# Patient Record
Sex: MALE | Race: WHITE | Employment: UNEMPLOYED | URBAN - METROPOLITAN AREA
[De-identification: names, ages, dates, MRNs, and addresses within clinical notes are randomized per-mention and may not be internally consistent; named-entity substitution may affect disease eponyms.]

---

## 2018-10-02 ENCOUNTER — OFFICE VISIT (OUTPATIENT)
Dept: DERMATOLOGY | Facility: AMBULATORY SURGERY CENTER | Age: 63
End: 2018-10-02

## 2018-10-02 VITALS
OXYGEN SATURATION: 98 % | BODY MASS INDEX: 30.44 KG/M2 | TEMPERATURE: 98.2 F | DIASTOLIC BLOOD PRESSURE: 88 MMHG | RESPIRATION RATE: 14 BRPM | HEART RATE: 62 BPM | HEIGHT: 76 IN | SYSTOLIC BLOOD PRESSURE: 140 MMHG | WEIGHT: 250 LBS

## 2018-10-02 DIAGNOSIS — Z80.8 FAMILY HISTORY OF SKIN CANCER: ICD-10-CM

## 2018-10-02 DIAGNOSIS — Z12.83 SCREENING FOR MALIGNANT NEOPLASM OF SKIN: ICD-10-CM

## 2018-10-02 DIAGNOSIS — Z87.898 HISTORY OF ATYPICAL NEVUS: Primary | ICD-10-CM

## 2018-10-02 DIAGNOSIS — L70.0 OPEN COMEDONE: ICD-10-CM

## 2018-10-02 DIAGNOSIS — L73.8 SEBACEOUS HYPERPLASIA OF FACE: ICD-10-CM

## 2018-10-02 DIAGNOSIS — D23.9 DERMATOFIBROMA: ICD-10-CM

## 2018-10-02 DIAGNOSIS — D22.9 MULTIPLE BENIGN NEVI: ICD-10-CM

## 2018-10-02 DIAGNOSIS — L73.9 FOLLICULITIS: ICD-10-CM

## 2018-10-02 DIAGNOSIS — D18.01 CHERRY ANGIOMA: ICD-10-CM

## 2018-10-02 DIAGNOSIS — L82.1 SEBORRHEIC KERATOSES: ICD-10-CM

## 2018-10-02 NOTE — PROGRESS NOTES
Written by Max Charles, as dictated by Tyler Noriega Ala, [address]. Name: Wendy Bermeo       Age: 61 y.o. Date: 10/2/2018    Chief Complaint:   Chief Complaint   Patient presents with    Skin Exam     full skin exam       Subjective:    HPI  Mr. Wendy Bermeo is a 61 y.o. male who presents for a full skin exam.  The patient's last skin exam was on 12/03/15 and the patient does not have current complaints related to his skin. He reports he was worried about a bleeding lesion on his left forearm that has since resolved one month ago. He states the lesion was present for 10 days. He has no other concerns. He reports little exposure to the sun. 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 No personal history of skin cancer. He reports a family history of skin cancer on his mother (type unknown)    ROS: Constitutional: Negative. Dermatological : negative    Social History     Social History    Marital status: SINGLE     Spouse name: N/A    Number of children: N/A    Years of education: N/A     Occupational History    Not on file.      Social History Main Topics    Smoking status: Former Smoker     Packs/day: 0.50     Years: 20.00     Quit date: 11/26/2002    Smokeless tobacco: Never Used      Comment: 2003    Alcohol use 7.5 oz/week     15 Standard drinks or equivalent per week    Drug use: No    Sexual activity: Not on file     Other Topics Concern    Not on file     Social History Narrative       Family History   Problem Relation Age of Onset    Hypertension Father     Diabetes Sister        Past Medical History:   Diagnosis Date    Allergic rhinitis, cause unspecified     CAD (coronary artery disease) 11/02    DMI    Depression     Family history of skin cancer     mother    Hypercholesterolemia     Hypertension     EDISON (obstructive sleep apnea)     using CPAP    Sunburn, blistering        Past Surgical History:   Procedure Laterality Date    HX HEART CATHETERIZATION  11/02    HX LUMBAR LAMINECTOMY  89    HX ORTHOPAEDIC      lumbar hnp    HX ORTHOPAEDIC      carpel tunnel       Current Outpatient Prescriptions   Medication Sig Dispense Refill    asenapine (SAPHRIS) 5 mg Subl SubLINGual tablet by SubLINGual route.  escitalopram (LEXAPRO) 20 mg tablet Take 20 mg by mouth daily.  rosuvastatin (CRESTOR) 40 mg tablet Take 40 mg by mouth nightly. NON FORMULARY       lisinopril-hydrochlorothiazide (PRINZIDE, ZESTORETIC) 20-12.5 mg per tablet TAKE 2 TABLETS EVERY DAY 60 Tab 4    atorvastatin (LIPITOR) 80 mg tablet Take 1 Tab by mouth daily. 30 Tab 6    cpap machine kit by Does Not Apply route.  metoprolol-XL (TOPROL XL) 100 mg XL tablet Take 1 Tab by mouth daily. 30 Tab 12    naproxen sodium (ALEVE) 220 mg tablet Take 220 mg by mouth daily (with breakfast). Indications: MILD ARTHRITIC PAIN, back; 2 qam      aspirin 81 mg Tab Take  by mouth.  amphetamine-dextroamphetamine XR (ADDERALL XR) 20 mg XR capsule Take 2 Caps by mouth every morning. 60 Cap 0    LORazepam (ATIVAN) 1 mg tablet Take  by mouth every four (4) hours as needed for Anxiety.  aripiprazole (ABILIFY) 2 mg tablet Take 2 mg by mouth as needed.  fluticasone (FLONASE) 50 mcg/Actuation nasal spray USE 2 SPRAYS IN EACH NOSTRIL DAILY 1 Bottle 11    citalopram (CELEXA) 20 mg tablet Take 10 mg by mouth daily.  AMBIEN CR 6.25 mg tablet Take 6.25 mg by mouth nightly as needed.  famotidine (PEPCID) 20 mg tablet Take 20 mg by mouth two (2) times daily as needed.  montelukast (SINGULAIR) 10 mg tablet Take 10 mg by mouth daily.          Allergies   Allergen Reactions    Levaquin [Levofloxacin] Unknown (comments)     Pt states he is no allergic  10/2/18 LRG-LPN         Objective:    Visit Vitals    /88 (BP 1 Location: Left arm, BP Patient Position: Sitting)    Pulse 62    Temp 98.2 °F (36.8 °C) (Oral)    Resp 14    Ht 6' 3.5\" (1.918 m)    Wt 250 lb (113.4 kg)    SpO2 98%    BMI 30.84 kg/m2       Germaine Villalobos is a 61 y.o. male who appears well and in no distress. He is awake, alert, and oriented. There is no preauricular, submandibular, or cervical lymphadenopathy. A skin examination was performed including his scalp, face (including eyelids), ears, neck, chest, back, abdomen, upper extremities (including digits/nails), lower extremities, breasts, buttocks; genital skin was not examined. He has scattered waxy macules and keratotic papules consistent with seborrheic keratoses. He has scattered red papules consistent with cherry angiomas. There are pink/yellow papules on the face consistent with sebaceous hyperplasia. He has numerous nevi on his trunk, largest on his abdomen, most numerous on his back. These are medium to dark brown color, centrally darker and lighter brown at the perimeter, none with significantly atypical features. He has pink follicular based papules and pustules on his back, shoulders, and trunk consistent with folliculitis. He has an open comedone on his back. He has a dermatofibroma on his left anterior leg. Assessment/Plan:    1. History of atypical nevus. I discussed sun protection, sunscreen use, the warning signs of skin cancer, the need for self-skin examinations, and the need for regular practitioner exams every 1 year. The patient should follow up sooner as needed if new, changing, or symptomatic skin lesions arise. 2. Family history of skin cancer. I discussed sun protection, sunscreen use, the warning signs of skin cancer, the need for self-skin examinations, and the need for regular practitioner exams every 1 year. The patient should follow up sooner as needed if new, changing, or symptomatic skin lesions arise. 3. Seborrheic keratoses. The diagnosis was reviewed and the patient was reassured that no treatment is needed for these benign lesions.     [facility] angiomas. The diagnosis was reviewed and the patient was reassured that no treatment is needed for these benign lesions. 5. Sebaceous Hyperplasia. The diagnosis was discussed as well as the benign nature of this condition. The patient was reassured that no treatment is needed at this time. 6. Normal nevi. The diagnosis of normal nevi was reviewed. I discussed sun protection, sunscreen use, the warning signs of skin cancer, the need for self-skin examinations, and the need for regular practitioner exams every 1 year. The patient should follow up sooner as needed if new, changing, or symptomatic skin lesions arise. 7. Folliculitis. The diagnosis was reviewed. I recommended the use of Selsun Blue on these areas. 8. Open comedone. The diagnosis was discussed and the patient desires removal. After verbal permission the material successfully extracted. 9. Dermatofibroma. The diagnosis was reviewed and the patient was reassured that no treatment is needed for these benign conditions at this time. The patient should follow up should the lesion change or become symptomatic. This plan was reviewed with the patient and patient agrees. All questions were answered. This scribe documentation was reviewed by me and accurately reflects the examination and decisions made by me.

## 2018-10-02 NOTE — MR AVS SNAPSHOT
[address] [facility] [address] 
[phone] Patient: Akbar Sewell MRN: [id number] NJW:5/24/2083 Visit Information Date & Time Provider Department Dept. Phone Encounter #  
 10/2/2018  1:00 PM LANDON Buckley [id number] Your Appointments 10/15/2018  9:00 AM  
New Patient with MD Shorty [address] ([facility]) Appt Note: New pt referred by [facility] for [address] current doctor is retiring will fax over Medical Records CSE 9/21/18 told to arrive @ 8:30 am  
 [address] [facility] [address] 1400 [address] [place] [address]  
[phone] [address] [facility] [address] [facility] Upcoming Health Maintenance Date Due Hepatitis C Screening 1955 Shingrix Vaccine Age 50> (1 of 2) 3/31/2005 FOBT Q 1 YEAR AGE 50-75 3/31/2005 DTaP/Tdap/Td series (2 - Tdap) 5/26/2018 Influenza Age 5 to Adult 8/1/2018 Allergies as of 10/2/2018  Review Complete On: 10/2/2018 By: Cathy Wells Severity Noted Reaction Type Reactions Levaquin [Levofloxacin]  04/26/2010    Unknown (comments) Pt states he is no allergic 
10/2/18 LRG-LPN Current Immunizations  Reviewed on 8/24/2011 Name Date DTAP Vaccine 5/26/2008 Influenza Vaccine Split 12/17/2010 ZZZ-RETIRED (DO NOT USE) Pneumococcal Vaccine (Unspecified Type) 4/26/2010 Not reviewed this visit Vitals BP Pulse Temp Resp Height(growth percentile) Weight(growth percentile) 140/88 (BP 1 Location: Left arm, BP Patient Position: Sitting) 62 98.2 °F (36.8 °C) (Oral) 14 6' 3.5\" (1.918 m) 250 lb (113.4 kg) SpO2 BMI Smoking Status 98% 30.84 kg/m2 Former Smoker Vitals History BMI and BSA Data Body Mass Index Body Surface Area  
 30.84 kg/m 2 2.46 m 2 Preferred Pharmacy Pharmacy Name Phone [id number] Zechariah Bhakta, [address] [facility] [phone] Your Updated Medication List  
  
   
This list is accurate as of 10/2/18  1:07 PM.  Always use your most recent med list.  
  
  
  
  
 ABILIFY 2 mg tablet Generic drug:  ARIPiprazole Take 2 mg by mouth as needed. ALEVE 220 mg tablet Generic drug:  naproxen sodium Take 220 mg by mouth daily (with breakfast). Indications: MILD ARTHRITIC PAIN, back; 2 qam  
  
 AMBIEN CR 6.25 mg tablet Generic drug:  zolpidem CR Take 6.25 mg by mouth nightly as needed. amphetamine-dextroamphetamine XR 20 mg XR capsule Commonly known as:  ADDERALL XR Take 2 Caps by mouth every morning. aspirin 81 mg tablet Take  by mouth. atorvastatin 80 mg tablet Commonly known as:  LIPITOR Take 1 Tab by mouth daily. CeleXA 20 mg tablet Generic drug:  citalopram  
Take 10 mg by mouth daily. cpap machine kit  
by Does Not Apply route. CRESTOR 40 mg tablet Generic drug:  rosuvastatin Take 40 mg by mouth nightly. NON FORMULARY  
  
 fluticasone 50 mcg/actuation nasal spray Commonly known as:  FLONASE  
USE 2 SPRAYS IN EACH NOSTRIL DAILY LEXAPRO 20 mg tablet Generic drug:  escitalopram oxalate Take 20 mg by mouth daily. lisinopril-hydroCHLOROthiazide 20-12.5 mg per tablet Commonly known as:  PRINZIDE, ZESTORETIC  
TAKE 2 TABLETS EVERY DAY  
  
 LORazepam 1 mg tablet Commonly known as:  ATIVAN Take  by mouth every four (4) hours as needed for Anxiety. metoprolol succinate 100 mg tablet Commonly known as:  TOPROL XL Take 1 Tab by mouth daily. PEPCID 20 mg tablet Generic drug:  famotidine Take 20 mg by mouth two (2) times daily as needed. SAPHRIS 5 mg Subl subLINGual tablet Generic drug:  asenapine  
by SubLINGual route. SINGULAIR 10 mg tablet Generic drug:  montelukast  
Take 10 mg by mouth daily. Patient Instructions Self Skin Exam and Sunscreens Early detection and treatment is essential in the treatment of all forms of skin cancer. If caught early, all forms of skin cancer are curable. In addition to your regular visits, you should perform a monthly skin examination. Over time, you become familiar with what is normally found on your skin and can identify new or suspicious spots. One of the screening tools you can use to assess your skin is to follow the ABCDEs: 
 
A= Asymmetry (One half is unlike the other half) B= Border (An irregular, scalloped or poorly defined edge) C= Color (Is varied from one area to another, has shades of tan, brown/ black,       white, red or blue) D= Diameter (Spots larger than 6mm or a pencil eraser) E= Evolving (New spots or one that is changing in size, shape, or color) A follow- up interval will be customized based on your history of skin cancer or level of skin damage and risk factors. In any case, if you notice a suspicious or new spot, an appointment should be arranged between regular visits. Everyone should use sunscreen and sun-safe practices, which is especially important for those with a personal or family history of skin cancer. Suggestions for this include: 1. Use daily moisturizers containing SPF 30 or higher. 2. Wear long sleeve clothing with UPF ratings and a broad-brimmed hat. 3. Apply sunscreen with SPF 30 or higher to all sun exposed areas if you are going to be in the sun. A broad spectrum UVA/ UVB sunscreen is best.  Dont forget to REAPPLY every two hours or more often if swimming or sweating! 4. Avoid outside activities during peak sun hours, especially in the summer (10am- 2pm). 5. DO NOT use tanning beds. Using sunscreen and sun-safe practices can help reduce the likelihood of developing skin cancer or additional skin cancers in those previously diagnosed. Introducing [facility] & HEALTH SERVICES! Dear Kate Cameron: Thank you for requesting a Net[employer] account. Our records indicate that you already have an active Netgen account. You can access your account anytime at [email or web address] Did you know that you can access your hospital and ER discharge instructions at any time in Netgen? You can also review all of your test results from your hospital stay or ER visit. Additional Information If you have questions, please visit the Frequently Asked Questions section of the Netgen website at [email or web address] Remember, Netgen is NOT to be used for urgent needs. For medical emergencies, dial 911. Now available from your iPhone and Android! Please provide this summary of care documentation to your next provider. Your primary care clinician is listed as Clair Louie. If you have any questions after today's visit, please call [phone].

## 2018-11-02 ENCOUNTER — OFFICE VISIT (OUTPATIENT)
Dept: BEHAVIORAL/MENTAL HEALTH CLINIC | Age: 63
End: 2018-11-02

## 2018-11-02 VITALS
HEIGHT: 76 IN | SYSTOLIC BLOOD PRESSURE: 147 MMHG | HEART RATE: 74 BPM | BODY MASS INDEX: 30.81 KG/M2 | WEIGHT: 253 LBS | DIASTOLIC BLOOD PRESSURE: 89 MMHG

## 2018-11-02 DIAGNOSIS — F10.14 ALCOHOL ABUSE WITH ALCOHOL-INDUCED MOOD DISORDER (HCC): Primary | ICD-10-CM

## 2018-11-02 DIAGNOSIS — G47.00 INSOMNIA DISORDER, WITH OTHER SLEEP DISORDER, RECURRENT: ICD-10-CM

## 2018-11-02 DIAGNOSIS — F10.20 ALCOHOL USE DISORDER, SEVERE, DEPENDENCE (HCC): ICD-10-CM

## 2018-11-02 DIAGNOSIS — G47.8 INSOMNIA DISORDER, WITH OTHER SLEEP DISORDER, RECURRENT: ICD-10-CM

## 2018-11-02 RX ORDER — DICLOFENAC SODIUM 75 MG/1
TABLET, DELAYED RELEASE ORAL
Refills: 2 | COMMUNITY
Start: 2018-10-22 | End: 2022-08-22 | Stop reason: ALTCHOICE

## 2018-11-02 RX ORDER — CLONAZEPAM 1 MG/1
TABLET ORAL
Refills: 2 | COMMUNITY
Start: 2018-09-26 | End: 2018-12-20 | Stop reason: SDUPTHER

## 2018-11-02 RX ORDER — LAMOTRIGINE 200 MG/1
TABLET ORAL
Refills: 5 | COMMUNITY
Start: 2018-10-22 | End: 2019-01-30 | Stop reason: SDUPTHER

## 2018-11-02 RX ORDER — AMLODIPINE BESYLATE 10 MG/1
5 TABLET ORAL
Refills: 11 | COMMUNITY
Start: 2018-10-22

## 2018-11-02 RX ORDER — ASENAPINE MALEATE 10 MG/1
10 TABLET SUBLINGUAL
Qty: 30 TAB | Refills: 5 | Status: SHIPPED | OUTPATIENT
Start: 2018-11-02 | End: 2019-02-04

## 2018-11-02 RX ORDER — ASENAPINE MALEATE 10 MG/1
TABLET SUBLINGUAL
Refills: 5 | COMMUNITY
Start: 2018-10-24 | End: 2018-11-02 | Stop reason: SDUPTHER

## 2018-11-02 RX ORDER — DEXTROAMPHETAMINE SACCHARATE, AMPHETAMINE ASPARTATE, DEXTROAMPHETAMINE SULFATE AND AMPHETAMINE SULFATE 5; 5; 5; 5 MG/1; MG/1; MG/1; MG/1
20 TABLET ORAL 2 TIMES DAILY
COMMUNITY
Start: 2017-08-17 | End: 2018-12-20 | Stop reason: SDUPTHER

## 2018-11-02 NOTE — PATIENT INSTRUCTIONS
Mr. Paulino Lefort, you need to attend an Alcohol Rehab program, Please or else we will not make any gains but add more pills that will prove dangerous and hazardous.

## 2018-11-02 NOTE — PROGRESS NOTES
INITIAL PSYCHIATRIC EVALUATION    IDENTIFICATION:      A. Name: Deni Baxter Age:     61 y.o.      C.   MRN: [id number]       D.   CSN:      [id number]      E. Admission Date: (Not on file)       PARK   :     1955               CHIEF COMPLAINT:  \" I have mood swings\". HPI: Mr. Keyur Barrios is a 60 y/o  (2nd for 29 years)( father of 2) [address], employed since March at [employer] He was  a patient of Dr. Alex Alexander whose care was transferred to this clinic. He was receiving treatment for Bipolar affective disorder and ADHD, alcohol use disorder and was on Lorazepam 1.5mg/day, Klonopin 1.5mg at hs,Lamictal 200mg at hs, Saphris SL 10mg daily,Lexapro 20mg and Adderall 20mg bid   He has been depressed since the ,following loss of business and filing for bankruptcy. He lost his job over 1.5 years ago and was unemployed for 1 year. He was fired because of major errors he made at work. He endorsed all the symptoms of depression on the Geriatricscale, which are currently active despite the 6 psychotropics he is prescribed. He continues to drink alcohol on a regular basis, more than a bottle of wine. He is not suicidal and never has been   He has marital friction ( wife is s/p lingual CA for which she had radiation treatment and has difficulty with articulation)        PRECIPITATING FACTORS:loss of business,filing for bankruptcy in the  but more recent,he was fired from his job 1&1/2 y/ago ( [employer]) because he made errors and was reprimanded.     COPING METHODS:  Internalize,drink alcohol    PHQ-9 scores/Geriatric depression scale:12/15 ( unhappy,dissatisfied,not active, empty,bored,fearful, helpless,isolating,memory problems,lack of energy,others are better off)  HAM-A scores:17/56 ( anxious,tense,memory deficits,dep, problems)  Mood Disorder Questionaire scores:8/13( irritable,racing thoughts,distractible,increased sex,spending,risky behavior)             REVIEW OF PSYCHIATRIC SYSTEMS:  Patient did not meet criteria for a  PTSD, Schizophrenia, Bipolar Affective disorder  Obsessive Compulsive Disorder, Anxiety Disorder, Agoraphobia, Psychotic disorders or ASD. PAST PSYCHIATRIC HISTORY:    Outpatient Psychiatric treatments:He has worked with  Marquis Frost, his therapist is Paz Perez  Suicide attempts/self inflictive behaviors:never  Hospitalizations:  [facility] for one day over 20 years ago ( locked self in br)  Medications Tried:  Celexa,Abilify,Ambien cR 6.25, Seroquel  ECT:   None   Legal/Assault History:  Nil    PAST SUBSTANCE ABUSE HISTORY:  Continues to drink alcohol, he scored 3/4 on the CAGE, denies other illicit drug use    FAMILY PSYCH HISTORY:Maternal grandfather may have been bipolar, maternal uncle with depression ,parents and siblings have suffered from anxiety and take Lorazepam.       SOCIAL HISTORY:  He is the oldest of 4 children born to an intact marriage, upbringing was uneventful, was bullied, [place] in the [place], then [place]. Father just  2 years . Has a BA from [employer], liberal arts degree/communication. Has been  twice, first lasted 4 years, currently still in second marriage. Has 2 children. Lives with wife. Longest job held was with  [employer] for 11 years    PAST MEDICAL/SURGICAL HISTORY:  EDISON,hypertension, hypercholesterolemia,history of CAD,s/p lumbar laminectomy, repair of CTS  Current Outpatient Medications   Medication Sig Dispense Refill    dextroamphetamine-amphetamine (ADDERALL) 20 mg tablet Take 20 mg by mouth two (2) times a day.  amLODIPine (NORVASC) 10 mg tablet TAKE 1 TABLET BY MOUTH EVERY DAY  11    clonazePAM (KLONOPIN) 1 mg tablet TAKE 1 & 1/2 TABLETS BY MOUTH AT BEDTIME AS NEEDED  2    lamoTRIgine (LAMICTAL) 200 mg tablet TAKE 1 TABLET BY MOUTH EVERY DAY  5    diclofenac EC (VOLTAREN) 75 mg EC tablet TAKE 1 TABLET (75 MG TOTAL) BY MOUTH 2 (TWO) TIMES A DAY.  TAKE WITH FOOD  2    SAPHRIS 10 mg subl 1 Tab by SubLINGual route nightly. 30 Tab 5    LORazepam (ATIVAN) 1 mg tablet Take  by mouth every four (4) hours as needed for Anxiety.  escitalopram (LEXAPRO) 20 mg tablet Take 20 mg by mouth daily.  rosuvastatin (CRESTOR) 40 mg tablet Take 40 mg by mouth nightly. NON FORMULARY       lisinopril-hydrochlorothiazide (PRINZIDE, ZESTORETIC) 20-12.5 mg per tablet TAKE 2 TABLETS EVERY DAY 60 Tab 4    atorvastatin (LIPITOR) 80 mg tablet Take 1 Tab by mouth daily. 30 Tab 6    fluticasone (FLONASE) 50 mcg/Actuation nasal spray USE 2 SPRAYS IN EACH NOSTRIL DAILY 1 Bottle 11    cpap machine kit by Does Not Apply route.  metoprolol-XL (TOPROL XL) 100 mg XL tablet Take 1 Tab by mouth daily. 30 Tab 12    naproxen sodium (ALEVE) 220 mg tablet Take 220 mg by mouth daily (with breakfast). Indications: MILD ARTHRITIC PAIN, back; 2 qam      aspirin 81 mg Tab Take  by mouth.  montelukast (SINGULAIR) 10 mg tablet Take 10 mg by mouth daily. Medical review of systems mainly considered within normal limits expect as noted in history above. Pertinent LABS: none seen since 2010      ALLERGIES:   He is allergic to levaquin [levofloxacin].       MENTAL STATUS EXAM:  Orientation person, place, time/date and situation    Behavior/Eye contact:  Good eye contact   Appearance:  age appropriate   Motor Behavior:  within normal limits but appears sluggish   Speech:  monotone, normal pitch and soft   Thought Process: goal directed and logical   Thought Content free of delusions and free of hallucinations   Suicidal ideations no plan  and no intention   Homicidal ideations no plan  and no intention   Mood:  depressed, irritable and sad   Affect:  blunted and constricted, almost parkinsonian   Memory recent  adequate   Memory remote:  adequate   Concentration:  adequate   Abstraction:  abstract   Insight:  good   Reliability good   Perceptual disortions  Absent( auditory,visual,olfactory,tactile), patient denied         ASSESSMENT:  The patient is a 61 y.o.  male with a history of what sounds like an Adjustment disorder with depression/anxiety that was compounded by alcohol use continuous and it's complications. Suicide/Homicide risk : (Nil,Low, Moderate, High)    PROVISIONAL DIAGNOSES:    ICD-10-CM ICD-9-CM   1. Alcohol abuse with alcohol-induced mood disorder ([facility] F10.14 291.89   2. Alcohol use disorder, severe, dependence ([facility] F10.20 303.90   3. Insomnia disorder, with other sleep disorder, recurrent G47.00 780.52       Orders Placed This Encounter    SAPHRIS 10 mg subl     Si Tab by SubLINGual route nightly. Dispense:  30 Tab     Refill:  5       TREATMENT PLAN:       1. Medications:  (Medication Changes/Adjustments)          WE called his pharmacy and he already has refills on Lamictal (2)Lexapro(2),Lorazepam (2)Klonopin (1), none on the Saphris . He has scripts for his Adderall still at home He last filled it in September ( \" I don't take but 1 a day, although it is prescribed for bid)     Saphris 10mg SL to take at HS #30 X 5 refills ordered       The following regarding medications was addressed: (The risks and benefits of the proposed medication; the potential medication side effects ie. dry mouth, weight gain, GI upset, headache; The  patient was given opportunity to ask questions)               2. Counseling/ psychotherapy:    Psych-education provided: The adverse effects of Alcohol on health and his mood. Avoidance of benzodiazepine, Adderall, and Saphris in his case, their adverse effects    3. Labs/ tests/ old records/ collateral: had work up 6 months ago, but no results here    4. Follow up : 4 weeks if not admitted to the Rehab program    5: If you feel suicidal after hours, please call the [address] [place] [address] @ [phone] OR GO TO THE NEAREST [address].  YOU MAY ALSO ACCESS THE SUICIDE HOTLINE @ [facility] OF SUICIDE. COM/RESOURCES\"    Referrals/Consults: ADVISED TO ATTEND AN ALCOHOL REHAB PROGRAM, LIST OF PLACES/PROGRAMS WAS PROVIDED. HE WAS ADVISED THAT W/O ABSTINENCE HE WILL NOT MAKE ANY PROGRESS IN HIS MENTAL HEALTH      Mr. Geronimo Coello has a reminder for a \"due or due soon\" health maintenance. I have asked that he contact his primary care provider for follow-up on this health maintenance. SIGNED:    Larry Sierra MD,   Adult Psychiatrist/Psychosomatic Medicine  11/2/2018

## 2018-11-29 ENCOUNTER — OFFICE VISIT (OUTPATIENT)
Dept: BEHAVIORAL/MENTAL HEALTH CLINIC | Age: 63
End: 2018-11-29

## 2018-11-29 VITALS
HEIGHT: 76 IN | SYSTOLIC BLOOD PRESSURE: 146 MMHG | HEART RATE: 90 BPM | DIASTOLIC BLOOD PRESSURE: 88 MMHG | WEIGHT: 253 LBS | BODY MASS INDEX: 30.81 KG/M2

## 2018-11-29 DIAGNOSIS — F10.14 ALCOHOL ABUSE WITH ALCOHOL-INDUCED MOOD DISORDER (HCC): Primary | ICD-10-CM

## 2018-11-29 DIAGNOSIS — F10.280 ALCOHOL DEPENDENCE WITH ALCOHOL-INDUCED ANXIETY DISORDER (HCC): ICD-10-CM

## 2018-11-29 DIAGNOSIS — G47.30 INSOMNIA WITH SLEEP APNEA: ICD-10-CM

## 2018-11-29 DIAGNOSIS — G47.00 INSOMNIA WITH SLEEP APNEA: ICD-10-CM

## 2018-11-29 RX ORDER — TOPIRAMATE 50 MG/1
TABLET, FILM COATED ORAL
Qty: 90 TAB | Refills: 1 | Status: SHIPPED | OUTPATIENT
Start: 2018-11-29 | End: 2019-01-17 | Stop reason: SDUPTHER

## 2018-11-29 NOTE — PROGRESS NOTES
Psychiatric Outpatient Progress Note    Account Number:  [id number]  Name: Dorene Gaming    SUBJECTIVE:     CHIEF COMPLAINT:    HPI:  Dorene Gaming is a 61 y.o. male and was seen today for follow-up of psychiatric condition and psychotropic medication management. Mr. Bre Bauer is a 60 y/o  (2nd for 29 years)( father of 2) [address], employed since March at [employer] He was  a patient of Dr. Bao Dennis whose care was transferred to this clinic. He was receiving treatment for Bipolar affective disorder and ADHD, alcohol use disorder and was on Lorazepam 1.5mg/day, Klonopin 1.5mg at hs,Lamictal 200mg at hs, Saphris SL 10mg daily,Lexapro 20mg and Adderall 20mg bid   He has been depressed since the 90s,following loss of business and filing for bankruptcy. He lost his job over 1.5 years ago and was unemployed for 1 year. He was fired because of major errors he made at work. He endorsed all the symptoms of depression on the Geriatricscale, which are currently active despite the 6 psychotropics he is prescribed. He continues to drink alcohol on a regular basis, more than a bottle of wine.    He is not suicidal and never has been   He has marital friction ( wife is s/p lingual CA for which she had radiation treatment and has difficulty with articulation)           PRECIPITATING FACTORS:loss of business,filing for bankruptcy in the 90s but more recent,he was fired from his job 1&1/2 y/ago ( [employer]) because he made errors and was reprimanded.     COPING METHODS:  Internalize,drink alcohol     PHQ-9 scores/Geriatric depression scale:12/15 ( unhappy,dissatisfied,not active, empty,bored,fearful, helpless,isolating,memory problems,lack of energy,others are better off)  HAM-A scores:17/56 ( anxious,tense,memory deficits,dep, problems)  Mood Disorder Questionaire scores:8/13( irritable,racing thoughts,distractible,increased sex,spending,risky behavior)        Course of events since last visit: Did not follow thru with alcohol rehab. Claims to be drinking only on the weekends. Fears losing his job. Discussed using Topiramate. Will also have therapist provide marital counseling, home environment is tense due to both kids being at home and keeping secrets. ..  . Patient denies SI/HI/SIB. 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 REVIEW OF SYSTEMS:  Pertinent items are noted in HPI. Visit Vitals  /88   Pulse 90   Ht 6' 3.5\" (1.918 m)   Wt 114.8 kg (253 lb)   BMI 31.21 kg/m²       OBJECTIVE:                 Mental Status exam: WNL except for      Attitude/Behavior     Eye Contact    appropriate   Appearance:  age appropriate and well dressed   Motor Behavior/Gait:  within normal limits   Speech:  hypoverbal and soft   Thought Process: goal directed and logical   Thought Content free of delusions and free of hallucinations   Perceptual distortions  Patient denied any visual,auditory,olfactory or gustatory hallucinations.  No illusions were reported or noted eithter   Suicidal ideations no plan  and no intention   Homicidal ideations no plan  and no intention   Mood:  anxious and depressed   Affect:  depressed     Orientation/sensorium  Alert and oriented to  date,place, situation and persons   Memory recent  adequate   Memory remote:  adequate   Concentration:  adequate   Abstraction:  abstract   Insight:  good   Reliability good   Judgment:  good       MEDICAL DECISION MAKING:     Data: pertinent labs, imaging, medical records and diagnostic tests reviewed and incorporated in diagnosis and treatment plan    Allergies   Allergen Reactions    Levaquin [Levofloxacin] Unknown (comments)     Pt states he is no allergic  10/2/18 [facility]        Current Outpatient Medications   Medication Sig Dispense Refill    topiramate (TOPAMAX) 50 mg tablet Take one at bedtime for one week, then take 2 at bedtime for one week, then take 3 at bedtime thereafter 90 Tab 1    dextroamphetamine-amphetamine (ADDERALL) 20 mg tablet Take 20 mg by mouth two (2) times a day.  amLODIPine (NORVASC) 10 mg tablet TAKE 1 TABLET BY MOUTH EVERY DAY  11    clonazePAM (KLONOPIN) 1 mg tablet TAKE 1 & 1/2 TABLETS BY MOUTH AT BEDTIME AS NEEDED  2    lamoTRIgine (LAMICTAL) 200 mg tablet TAKE 1 TABLET BY MOUTH EVERY DAY  5    diclofenac EC (VOLTAREN) 75 mg EC tablet TAKE 1 TABLET (75 MG TOTAL) BY MOUTH 2 (TWO) TIMES A DAY. TAKE WITH FOOD  2    SAPHRIS 10 mg subl 1 Tab by SubLINGual route nightly. 30 Tab 5    LORazepam (ATIVAN) 1 mg tablet Take  by mouth every four (4) hours as needed for Anxiety.  escitalopram (LEXAPRO) 20 mg tablet Take 20 mg by mouth daily.  rosuvastatin (CRESTOR) 40 mg tablet Take 40 mg by mouth nightly. NON FORMULARY       lisinopril-hydrochlorothiazide (PRINZIDE, ZESTORETIC) 20-12.5 mg per tablet TAKE 2 TABLETS EVERY DAY 60 Tab 4    atorvastatin (LIPITOR) 80 mg tablet Take 1 Tab by mouth daily. 30 Tab 6    fluticasone (FLONASE) 50 mcg/Actuation nasal spray USE 2 SPRAYS IN EACH NOSTRIL DAILY 1 Bottle 11    cpap machine kit by Does Not Apply route.  metoprolol-XL (TOPROL XL) 100 mg XL tablet Take 1 Tab by mouth daily. 30 Tab 12    naproxen sodium (ALEVE) 220 mg tablet Take 220 mg by mouth daily (with breakfast). Indications: MILD ARTHRITIC PAIN, back; 2 qam      aspirin 81 mg Tab Take  by mouth.  montelukast (SINGULAIR) 10 mg tablet Take 10 mg by mouth daily.                     ICD-10-CM ICD-9-CM    1. Alcohol abuse with alcohol-induced mood disorder (HCC) F10.14 291.89 topiramate (TOPAMAX) 50 mg tablet   2. Alcohol dependence with alcohol-induced anxiety disorder (HCC) F10.280 303.90 topiramate (TOPAMAX) 50 mg tablet     291.89    3.  Insomnia with sleep apnea G47.00 780.51     G47.30         Orders Placed This Encounter    topiramate (TOPAMAX) 50 mg tablet     Sig: Take one at bedtime for one week, then take 2 at bedtime for one week, then take 3 at bedtime thereafter     Dispense:  90 Tab     Refill:  1           Assessment:   Stella Johnson  is a 61 y.o.  male  is not responding to treatment. Symptoms are persisting since he still drinks, longest duration w/o alcohol was  7 days. Patient denies SI/HI/SIB. No evidence of AH/VH or delusions. Risk Scoring- chronic illnesses and prescription drug management    Treatment PlanTreatment plan reviewed with patient-including diagnosis and medications:            Medication Changes/Adjustments: Will place on Topiramate 50mg at hs and titrate to 150mg in two weeks    Current Outpatient Medications   Medication Sig Dispense Refill    topiramate (TOPAMAX) 50 mg tablet Take one at bedtime for one week, then take 2 at bedtime for one week, then take 3 at bedtime thereafter 90 Tab 1    dextroamphetamine-amphetamine (ADDERALL) 20 mg tablet Take 20 mg by mouth two (2) times a day.  amLODIPine (NORVASC) 10 mg tablet TAKE 1 TABLET BY MOUTH EVERY DAY  11    clonazePAM (KLONOPIN) 1 mg tablet TAKE 1 & 1/2 TABLETS BY MOUTH AT BEDTIME AS NEEDED  2    lamoTRIgine (LAMICTAL) 200 mg tablet TAKE 1 TABLET BY MOUTH EVERY DAY  5    diclofenac EC (VOLTAREN) 75 mg EC tablet TAKE 1 TABLET (75 MG TOTAL) BY MOUTH 2 (TWO) TIMES A DAY. TAKE WITH FOOD  2    SAPHRIS 10 mg subl 1 Tab by SubLINGual route nightly. 30 Tab 5    LORazepam (ATIVAN) 1 mg tablet Take  by mouth every four (4) hours as needed for Anxiety.  escitalopram (LEXAPRO) 20 mg tablet Take 20 mg by mouth daily.  rosuvastatin (CRESTOR) 40 mg tablet Take 40 mg by mouth nightly. NON FORMULARY       lisinopril-hydrochlorothiazide (PRINZIDE, ZESTORETIC) 20-12.5 mg per tablet TAKE 2 TABLETS EVERY DAY 60 Tab 4    atorvastatin (LIPITOR) 80 mg tablet Take 1 Tab by mouth daily. 30 Tab 6    fluticasone (FLONASE) 50 mcg/Actuation nasal spray USE 2 SPRAYS IN EACH NOSTRIL DAILY 1 Bottle 11    cpap machine kit by Does Not Apply route.       metoprolol-XL (TOPROL XL) 100 mg XL tablet Take 1 Tab by mouth daily. 30 Tab 12    naproxen sodium (ALEVE) 220 mg tablet Take 220 mg by mouth daily (with breakfast). Indications: MILD ARTHRITIC PAIN, back; 2 qam      aspirin 81 mg Tab Take  by mouth.  montelukast (SINGULAIR) 10 mg tablet Take 10 mg by mouth daily. The following regarding medications was addressed:    (The risks, benefits of the proposed medication and the potential medication side effects ie dry mouth, weight gain, GI upset, headache). The patient was given the opportunity to ask questions. The patient was informed of the consequences of refusing medications and non-compliance. 2.  Counseling and coordination of care including instructions for treatment, risks/benefits, risk factor reduction and patient/family education. He agrees with the plan. 3.Patient instructed to call with any side effects, questions or issues. PSYCHOTHERAPY:  approx 18 minutes  Supportive but challenged his rationalization. ..which enables to continue to drink  Homework given regarding:   Psychoeducation with handouts provided:  Effects of alcohol on health and emotional well being             Mr. Sary Savage has a reminder for a \"due or due soon\" health maintenance. I have asked that he contact his primary care provider for follow-up on this health maintenance. Azul Karla is not progressing. Follow-up Disposition:  Return in about 3 weeks (around 12/20/2018).       Frankey Fairly, MD  11/29/2018

## 2018-12-20 ENCOUNTER — OFFICE VISIT (OUTPATIENT)
Dept: BEHAVIORAL/MENTAL HEALTH CLINIC | Age: 63
End: 2018-12-20

## 2018-12-20 VITALS
DIASTOLIC BLOOD PRESSURE: 71 MMHG | WEIGHT: 244 LBS | SYSTOLIC BLOOD PRESSURE: 115 MMHG | BODY MASS INDEX: 29.71 KG/M2 | HEIGHT: 76 IN | HEART RATE: 78 BPM

## 2018-12-20 DIAGNOSIS — G47.30 INSOMNIA WITH SLEEP APNEA: ICD-10-CM

## 2018-12-20 DIAGNOSIS — F10.14 ALCOHOL ABUSE WITH ALCOHOL-INDUCED MOOD DISORDER (HCC): Primary | ICD-10-CM

## 2018-12-20 DIAGNOSIS — G47.00 INSOMNIA WITH SLEEP APNEA: ICD-10-CM

## 2018-12-20 DIAGNOSIS — Z91.89 COMPLIANCE WITH MEDICATION REGIMEN: ICD-10-CM

## 2018-12-20 DIAGNOSIS — F33.9 EPISODE OF RECURRENT MAJOR DEPRESSIVE DISORDER, UNSPECIFIED DEPRESSION EPISODE SEVERITY (HCC): ICD-10-CM

## 2018-12-20 DIAGNOSIS — F41.9 ANXIETY: ICD-10-CM

## 2018-12-20 RX ORDER — ESCITALOPRAM OXALATE 20 MG/1
20 TABLET ORAL DAILY
Qty: 30 TAB | Refills: 5 | Status: SHIPPED | OUTPATIENT
Start: 2018-12-20 | End: 2019-07-30 | Stop reason: SDUPTHER

## 2018-12-20 RX ORDER — CLONAZEPAM 1 MG/1
1.5 TABLET ORAL
Qty: 45 TAB | Refills: 3 | Status: SHIPPED | OUTPATIENT
Start: 2018-12-20 | End: 2019-04-16 | Stop reason: SDUPTHER

## 2018-12-20 RX ORDER — DEXTROAMPHETAMINE SACCHARATE, AMPHETAMINE ASPARTATE, DEXTROAMPHETAMINE SULFATE AND AMPHETAMINE SULFATE 5; 5; 5; 5 MG/1; MG/1; MG/1; MG/1
20 TABLET ORAL 2 TIMES DAILY
Qty: 60 TAB | Refills: 0 | Status: SHIPPED | OUTPATIENT
Start: 2018-12-20 | End: 2019-04-16 | Stop reason: SDUPTHER

## 2018-12-20 NOTE — PROGRESS NOTES
Psychiatric Outpatient Progress Note    Account Number:  [id number]  Name: Alec Loera    SUBJECTIVE:     CHIEF COMPLAINT:    HPI:  Alec Loera is a 61 y.o. male and was seen today for follow-up of psychiatric condition and psychotropic medication management. Mr. Ilsa aTmayo is a 62 y/o  (2nd for 29 years)( father of 2) [address], employed since March at [employer] He was  a patient of Dr. Jordan Bueno whose care was transferred to this clinic. He was receiving treatment for Bipolar affective disorder and ADHD, alcohol use disorder and was on Lorazepam 1.5mg/day, Klonopin 1.5mg at hs,Lamictal 200mg at hs, Saphris SL 10mg daily,Lexapro 20mg and Adderall 20mg bid   He has been depressed since the 90s,following loss of business and filing for bankruptcy. He lost his job over 1.5 years ago and was unemployed for 1 year. He was fired because of major errors he made at work. He endorsed all the symptoms of depression on the Geriatricscale, which are currently active despite the 6 psychotropics he is prescribed. He continues to drink alcohol on a regular basis, more than a bottle of wine.    He is not suicidal and never has been   He has marital friction ( wife is s/p lingual CA for which she had radiation treatment and has difficulty with articulation)           PRECIPITATING FACTORS:loss of business,filing for bankruptcy in the 90s but more recent,he was fired from his job 1&1/2 y/ago ( [employer]) because he made errors and was reprimanded.     COPING METHODS:  Internalize,drink alcohol     PHQ-9 scores/Geriatric depression scale:12/15 ( unhappy,dissatisfied,not active, empty,bored,fearful, helpless,isolating,memory problems,lack of energy,others are better off)  HAM-A scores:17/56 ( anxious,tense,memory deficits,dep, problems)  Mood Disorder Questionaire scores:8/13( irritable,racing thoughts,distractible,increased sex,spending,risky behavior)        Course of events since last visit: Since he did not follow thru with alcohol rehab earlier, he was placed on Topiramate in the last visit. He returns today stating that he hasn't been drinking any alcohol since the last time and that he did not have any withdrawal symptoms. He has managed w/o alcohol. However he continues to tell me he needs the Saphris because when he went out of town, he could not sleep w/o it. He also insisted on the Benzodiazepines, 'help me with motivation and anxiety, because he worry about so many things that I am not \"there\", my family tells me that I space out\". He is clearly preoccupied with matters pertaining to income and his son's behavior. His home life is tense. He is seeing Tal Castro on an individual basis. He continues to be anxious and worried, like going to his mothers for [place]. Not sure what medicine is doing what??  .    Patient denies SI/HI/SIB. 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 REVIEW OF SYSTEMS:  Pertinent items are noted in HPI. Visit Vitals  /71   Pulse 78   Ht 6' 3.5\" (1.918 m)   Wt 110.7 kg (244 lb)   BMI 30.10 kg/m²       OBJECTIVE:                 Mental Status exam: WNL except for      Attitude/Behavior Guarded but cooperative, shy    Eye Contact    appropriate   Appearance:  age appropriate and well dressed   Motor Behavior/Gait:  within normal limits   Speech:  hypoverbal and soft   Thought Process: goal directed and logical   Thought Content free of delusions and free of hallucinations   Perceptual distortions  Patient denied any visual,auditory,olfactory or gustatory hallucinations.  No illusions were reported or noted eithter   Suicidal ideations no plan  and no intention   Homicidal ideations no plan  and no intention   Mood:  anxious and depressed   Affect:  depressed     Orientation/sensorium  Alert and oriented to  date,place, situation and persons   Memory recent  adequate   Memory remote:  adequate   Concentration:  adequate Abstraction:  abstract   Insight:  good   Reliability good   Judgment:  good       MEDICAL DECISION MAKING:     Data: pertinent labs, imaging, medical records and diagnostic tests reviewed and incorporated in diagnosis and treatment plan    Allergies   Allergen Reactions    Levaquin [Levofloxacin] Unknown (comments)     Pt states he is no allergic  10/2/18 [facility]        Current Outpatient Medications   Medication Sig Dispense Refill    escitalopram oxalate (LEXAPRO) 20 mg tablet Take 1 Tab by mouth daily. 30 Tab 5    dextroamphetamine-amphetamine (ADDERALL) 20 mg tablet Take 1 Tab (20 mg total) by mouth two (2) times a day. Max Daily Amount: 40 mg 60 Tab 0    clonazePAM (KLONOPIN) 1 mg tablet Take 1.5 Tabs by mouth nightly. Max Daily Amount: 1.5 mg. 45 Tab 3    topiramate (TOPAMAX) 50 mg tablet Take one at bedtime for one week, then take 2 at bedtime for one week, then take 3 at bedtime thereafter (Patient taking differently: take 3 at bedtime) 90 Tab 1    amLODIPine (NORVASC) 10 mg tablet TAKE 1 TABLET BY MOUTH EVERY DAY  11    lamoTRIgine (LAMICTAL) 200 mg tablet TAKE 1 TABLET BY MOUTH EVERY DAY  5    diclofenac EC (VOLTAREN) 75 mg EC tablet TAKE 1 TABLET (75 MG TOTAL) BY MOUTH 2 (TWO) TIMES A DAY. TAKE WITH FOOD  2    SAPHRIS 10 mg subl 1 Tab by SubLINGual route nightly. 30 Tab 5    LORazepam (ATIVAN) 1 mg tablet Take  by mouth every four (4) hours as needed for Anxiety.  rosuvastatin (CRESTOR) 40 mg tablet Take 40 mg by mouth nightly. NON FORMULARY       lisinopril-hydrochlorothiazide (PRINZIDE, ZESTORETIC) 20-12.5 mg per tablet TAKE 2 TABLETS EVERY DAY 60 Tab 4    atorvastatin (LIPITOR) 80 mg tablet Take 1 Tab by mouth daily. 30 Tab 6    fluticasone (FLONASE) 50 mcg/Actuation nasal spray USE 2 SPRAYS IN EACH NOSTRIL DAILY 1 Bottle 11    cpap machine kit by Does Not Apply route.  metoprolol-XL (TOPROL XL) 100 mg XL tablet Take 1 Tab by mouth daily.  30 Tab 12    naproxen sodium (ALEVE) 220 mg tablet Take 220 mg by mouth daily (with breakfast). Indications: MILD ARTHRITIC PAIN, back; 2 qam      aspirin 81 mg Tab Take  by mouth.  montelukast (SINGULAIR) 10 mg tablet Take 10 mg by mouth daily.                     ICD-10-CM ICD-9-CM    1. Alcohol abuse with alcohol-induced mood disorder ([facility] [address] F10.14 291.89    2. Insomnia with sleep apnea G47.00 780.51     G47.30     3. Episode of recurrent major depressive disorder, unspecified depression episode severity (HCC) F33.9 296.30 escitalopram oxalate (LEXAPRO) 20 mg tablet      dextroamphetamine-amphetamine (ADDERALL) 20 mg tablet   4. Anxiety F41.9 300.00 clonazePAM (KLONOPIN) 1 mg tablet   5. Compliance with medication regimen Z91.89 V49.89 10-PANEL URINE DRUG SCREEN       Orders Placed This Encounter    10-PANEL URINE DRUG SCREEN    escitalopram oxalate (LEXAPRO) 20 mg tablet     Sig: Take 1 Tab by mouth daily. Dispense:  30 Tab     Refill:  5    dextroamphetamine-amphetamine (ADDERALL) 20 mg tablet     Sig: Take 1 Tab (20 mg total) by mouth two (2) times a day. Max Daily Amount: 40 mg     Dispense:  60 Tab     Refill:  0    clonazePAM (KLONOPIN) 1 mg tablet     Sig: Take 1.5 Tabs by mouth nightly. Max Daily Amount: 1.5 mg.     Dispense:  45 Tab     Refill:  3           Assessment:   Yamilex Rock  is a 61 y.o.  male  is  responding to treatment since he has BEEN ABSTINENT FROM ALCOHOL FOR THE PAST 3 WEEKS. HOWEVER HE REMAINS WORRIED ABOUT LEGITIMATE ISSUES AND APPEARS DEPRESSED. Patient denies SI/HI/SIB. No evidence of AH/VH or delusions. Risk Scoring- chronic illnesses and prescription drug management    Treatment PlanTreatment plan reviewed with patient-including diagnosis and medications:            Medication Changes/Adjustments: Will continue Topiramate 150mg at hs of which he has 1 refill left.  Will renew Saphris 10mg, Klonopin 1.5mg at hs, Lexapro and Adderall 20mg bid ( prn when needed at work)     Current Outpatient Medications   Medication Sig Dispense Refill    escitalopram oxalate (LEXAPRO) 20 mg tablet Take 1 Tab by mouth daily. 30 Tab 5    dextroamphetamine-amphetamine (ADDERALL) 20 mg tablet Take 1 Tab (20 mg total) by mouth two (2) times a day. Max Daily Amount: 40 mg 60 Tab 0    clonazePAM (KLONOPIN) 1 mg tablet Take 1.5 Tabs by mouth nightly. Max Daily Amount: 1.5 mg. 45 Tab 3    topiramate (TOPAMAX) 50 mg tablet Take one at bedtime for one week, then take 2 at bedtime for one week, then take 3 at bedtime thereafter (Patient taking differently: take 3 at bedtime) 90 Tab 1    amLODIPine (NORVASC) 10 mg tablet TAKE 1 TABLET BY MOUTH EVERY DAY  11    lamoTRIgine (LAMICTAL) 200 mg tablet TAKE 1 TABLET BY MOUTH EVERY DAY  5    diclofenac EC (VOLTAREN) 75 mg EC tablet TAKE 1 TABLET (75 MG TOTAL) BY MOUTH 2 (TWO) TIMES A DAY. TAKE WITH FOOD  2    SAPHRIS 10 mg subl 1 Tab by SubLINGual route nightly. 30 Tab 5    LORazepam (ATIVAN) 1 mg tablet Take  by mouth every four (4) hours as needed for Anxiety.  rosuvastatin (CRESTOR) 40 mg tablet Take 40 mg by mouth nightly. NON FORMULARY       lisinopril-hydrochlorothiazide (PRINZIDE, ZESTORETIC) 20-12.5 mg per tablet TAKE 2 TABLETS EVERY DAY 60 Tab 4    atorvastatin (LIPITOR) 80 mg tablet Take 1 Tab by mouth daily. 30 Tab 6    fluticasone (FLONASE) 50 mcg/Actuation nasal spray USE 2 SPRAYS IN EACH NOSTRIL DAILY 1 Bottle 11    cpap machine kit by Does Not Apply route.  metoprolol-XL (TOPROL XL) 100 mg XL tablet Take 1 Tab by mouth daily. 30 Tab 12    naproxen sodium (ALEVE) 220 mg tablet Take 220 mg by mouth daily (with breakfast). Indications: MILD ARTHRITIC PAIN, back; 2 qam      aspirin 81 mg Tab Take  by mouth.  montelukast (SINGULAIR) 10 mg tablet Take 10 mg by mouth daily.                     The following regarding medications was addressed:    (The risks, benefits of the proposed medication and the potential medication side effects ie dry mouth, weight gain, GI upset, headache). The patient was given the opportunity to ask questions. The patient was informed of the consequences of refusing medications and non-compliance. 2.  Counseling and coordination of care including instructions for treatment, risks/benefits, risk factor reduction and patient/family education. He agrees with the plan. 3.Patient instructed to call with any side effects, questions or issues. PSYCHOTHERAPY:  approx 20 minutes  Supportive but challenged his rationalization. ..which enables to continue to drink  Homework given regarding:   Psychoeducation with handouts provided:  Educated on the effects of klonopin and ativan with respect to alcohol withdrawal. Challenged regarding taking all these medications but remaining symptomatic, need to decipher which is doing what and to minimize. He agreed. Also signed the Controlled substance agreement. UDS ordered. Mr. Chico Sunshine has a reminder for a \"due or due soon\" health maintenance. I have asked that he contact his primary care provider for follow-up on this health maintenance. Uriel Ashuas is not progressing. Follow-up Disposition:  Return in about 4 weeks (around 1/17/2019).       Lizzie Verma MD  12/20/2018

## 2019-01-17 ENCOUNTER — OFFICE VISIT (OUTPATIENT)
Dept: BEHAVIORAL/MENTAL HEALTH CLINIC | Age: 64
End: 2019-01-17

## 2019-01-17 VITALS
HEART RATE: 62 BPM | SYSTOLIC BLOOD PRESSURE: 122 MMHG | WEIGHT: 247 LBS | HEIGHT: 76 IN | BODY MASS INDEX: 30.08 KG/M2 | DIASTOLIC BLOOD PRESSURE: 76 MMHG

## 2019-01-17 DIAGNOSIS — F10.14 ALCOHOL ABUSE WITH ALCOHOL-INDUCED MOOD DISORDER (HCC): ICD-10-CM

## 2019-01-17 DIAGNOSIS — G47.30 INSOMNIA WITH SLEEP APNEA: ICD-10-CM

## 2019-01-17 DIAGNOSIS — G47.00 INSOMNIA WITH SLEEP APNEA: ICD-10-CM

## 2019-01-17 DIAGNOSIS — F33.9 EPISODE OF RECURRENT MAJOR DEPRESSIVE DISORDER, UNSPECIFIED DEPRESSION EPISODE SEVERITY (HCC): ICD-10-CM

## 2019-01-17 DIAGNOSIS — F10.280 ALCOHOL DEPENDENCE WITH ALCOHOL-INDUCED ANXIETY DISORDER (HCC): Primary | ICD-10-CM

## 2019-01-17 RX ORDER — TOPIRAMATE 50 MG/1
150 TABLET, FILM COATED ORAL DAILY
Qty: 90 TAB | Refills: 3 | Status: SHIPPED | OUTPATIENT
Start: 2019-01-17 | End: 2019-04-16 | Stop reason: SDUPTHER

## 2019-01-17 RX ORDER — TAMSULOSIN HYDROCHLORIDE 0.4 MG/1
0.4 CAPSULE ORAL DAILY
COMMUNITY

## 2019-01-17 NOTE — PROGRESS NOTES
Psychiatric Outpatient Progress Note    Account Number:  [id number]  Name: Estrada Savage    SUBJECTIVE:     CHIEF COMPLAINT:    HPI:  Estrada Savage is a 61 y.o. male and was seen today for follow-up of psychiatric condition and psychotropic medication management. Mr. Nam Huerta is a 62 y/o  (2nd for 29 years)( father of 2) [address], employed since March at [employer] He was  a patient of Dr. Janusz Porter whose care was transferred to this clinic. He was receiving treatment for Bipolar affective disorder and ADHD, alcohol use disorder and was on Lorazepam 1.5mg/day, Klonopin 1.5mg at hs,Lamictal 200mg at hs, Saphris SL 10mg daily,Lexapro 20mg and Adderall 20mg bid   He has been depressed since the 90s,following loss of business and filing for bankruptcy. He lost his job over 1.5 years ago and was unemployed for 1 year. He was fired because of major errors he made at work. He endorsed all the symptoms of depression on the Geriatricscale, which are currently active despite the 6 psychotropics he is prescribed. He continues to drink alcohol on a regular basis, more than a bottle of wine.    He is not suicidal and never has been   He has marital friction ( wife is s/p lingual CA for which she had radiation treatment and has difficulty with articulation)           PRECIPITATING FACTORS:loss of business,filing for bankruptcy in the 90s but more recent,he was fired from his job 1&1/2 y/ago ( [employer]) because he made errors and was reprimanded.     COPING METHODS:  Internalize,drink alcohol     PHQ-9 scores/Geriatric depression scale:12/15 ( unhappy,dissatisfied,not active, empty,bored,fearful, helpless,isolating,memory problems,lack of energy,others are better off)  HAM-A scores:17/56 ( anxious,tense,memory deficits,dep, problems)  Mood Disorder Questionaire scores:8/13( irritable,racing thoughts,distractible,increased sex,spending,risky behavior)        Course of events since last visit: IN the last Visit, the  Saphris 10mg, Klonopin 1.5mg at hs, Lexapro and Adderall 20mg bid ( prn when needed at work) were renewed. He was still taking the Topiramate  He is clearly preoccupied with matters pertaining to income and his family. He is ignored by them despite their dependence on him. He is disturbed about their nonchalant behavior and lack of support from his wife. He is seeing Lubna Olivas on an individual basis. Established problems: alcohol  Use, depression, anxiety, insomnia  New Problems: urinary frequency,tooth decay  R/O EARLY DRUG INDUCED PARKINSONIAN SYMPTOMS    Patient denies SI/HI/SIB     Side Effects:  none      Fam/Social changes: Finally got a raise in his base pay which has provided more hope for him. REVIEW OF SYSTEMS:  Pertinent items are noted in HPI. Visit Vitals  /76   Pulse 62   Ht 6' 3.5\" (1.918 m)   Wt 112 kg (247 lb)   BMI 30.47 kg/m²       OBJECTIVE:                 Mental Status exam: WNL except for      Attitude/Behavior Guarded but cooperative, shy    Eye Contact    appropriate   Appearance:  age appropriate and well dressed   Motor Behavior/Gait:  BRADYKINESIA NOTED   Speech:  hypoverbal and soft   Thought Process: goal directed and logical   Thought Content free of delusions and free of hallucinations   Perceptual distortions  Patient denied any visual,auditory,olfactory or gustatory hallucinations. No illusions were reported or noted eithter   Suicidal ideations no plan  and no intention   Homicidal ideations no plan  and no intention   Mood:  anxious and depressed   Affect:  Depressed, ALMOST FLAT AFFECT     Orientation/sensorium  Alert and oriented to  date,place, situation and persons   Memory recent  adequate   Memory remote:  adequate   Concentration:  adequate   Abstraction:  abstract   Insight:  good   Reliability good   Judgment:  good       MEDICAL DECISION MAKING:  [facility].  MAINLY LABS PERTAINING TO UA, CBCD,CMP , ALL OF WHICH WERE NORMAL AS OF JAN. 0,4455    Data: pertinent labs, imaging, medical records and diagnostic tests reviewed and incorporated in diagnosis and treatment plan    Allergies   Allergen Reactions    Levaquin [Levofloxacin] Unknown (comments)     Pt states he is no allergic  10/2/18 [facility]-LPN        Current Outpatient Medications   Medication Sig Dispense Refill    tamsulosin (FLOMAX) 0.4 mg capsule Take 0.4 mg by mouth daily.  topiramate (TOPAMAX) 50 mg tablet Take 3 Tabs by mouth daily. 90 Tab 3    escitalopram oxalate (LEXAPRO) 20 mg tablet Take 1 Tab by mouth daily. 30 Tab 5    dextroamphetamine-amphetamine (ADDERALL) 20 mg tablet Take 1 Tab (20 mg total) by mouth two (2) times a day. Max Daily Amount: 40 mg (Patient taking differently: Take 20 mg by mouth daily.        ) 60 Tab 0    clonazePAM (KLONOPIN) 1 mg tablet Take 1.5 Tabs by mouth nightly. Max Daily Amount: 1.5 mg. 45 Tab 3    amLODIPine (NORVASC) 10 mg tablet TAKE 1 TABLET BY MOUTH EVERY DAY  11    lamoTRIgine (LAMICTAL) 200 mg tablet TAKE 1 TABLET BY MOUTH EVERY DAY  5    diclofenac EC (VOLTAREN) 75 mg EC tablet TAKE 1 TABLET (75 MG TOTAL) BY MOUTH 2 (TWO) TIMES A DAY. TAKE WITH FOOD  2    SAPHRIS 10 mg subl 1 Tab by SubLINGual route nightly. 30 Tab 5    LORazepam (ATIVAN) 1 mg tablet Take  by mouth every four (4) hours as needed for Anxiety.  rosuvastatin (CRESTOR) 40 mg tablet Take 40 mg by mouth nightly. NON FORMULARY       lisinopril-hydrochlorothiazide (PRINZIDE, ZESTORETIC) 20-12.5 mg per tablet TAKE 2 TABLETS EVERY DAY 60 Tab 4    atorvastatin (LIPITOR) 80 mg tablet Take 1 Tab by mouth daily. 30 Tab 6    fluticasone (FLONASE) 50 mcg/Actuation nasal spray USE 2 SPRAYS IN EACH NOSTRIL DAILY 1 Bottle 11    metoprolol-XL (TOPROL XL) 100 mg XL tablet Take 1 Tab by mouth daily. 30 Tab 12    naproxen sodium (ALEVE) 220 mg tablet Take 220 mg by mouth daily (with breakfast).  Indications: MILD ARTHRITIC PAIN, back; 2 qam      aspirin 81 mg Tab Take  by mouth.  cpap machine kit by Does Not Apply route.  montelukast (SINGULAIR) 10 mg tablet Take 10 mg by mouth daily.                     ICD-10-CM ICD-9-CM    1. Alcohol dependence with alcohol-induced anxiety disorder (HCC) F10.280 303.90 topiramate (TOPAMAX) 50 mg tablet     291.89    2. Insomnia with sleep apnea G47.00 780.51     G47.30     3. Episode of recurrent major depressive disorder, unspecified depression episode severity ([facility] [address] F33.9 296.30    4. Alcohol abuse with alcohol-induced mood disorder (HCC) F10.14 291.89 topiramate (TOPAMAX) 50 mg tablet       Orders Placed This Encounter    topiramate (TOPAMAX) 50 mg tablet     Sig: Take 3 Tabs by mouth daily. Dispense:  90 Tab     Refill:  3           Assessment:   Una Marinelli  is a 61 y.o.  male  is  responding to treatment since he has BEEN ABSTINENT FROM ALCOHOL except for new years jitendra and occasional use (only 2) on the weekends. His depressed affect is related to situation at home, sense of helplessness. Patient denies SI/HI/SIB. No evidence of AH/VH or delusions. Treatment PlanTreatment plan reviewed with patient-including diagnosis and medications:            Medication Changes/Adjustments: Will renew Topiramate 150mg at hs of which he has 1 refill left. He has ample refills of other medications. ADVISED TO TAKE LESS SAPHRIS(FROM 10 TO 5MG ) TO PREVENT PARKINSONIAN SYMPTOMS. Current Outpatient Medications   Medication Sig Dispense Refill    tamsulosin (FLOMAX) 0.4 mg capsule Take 0.4 mg by mouth daily.  topiramate (TOPAMAX) 50 mg tablet Take 3 Tabs by mouth daily. 90 Tab 3    escitalopram oxalate (LEXAPRO) 20 mg tablet Take 1 Tab by mouth daily. 30 Tab 5    dextroamphetamine-amphetamine (ADDERALL) 20 mg tablet Take 1 Tab (20 mg total) by mouth two (2) times a day.   Max Daily Amount: 40 mg (Patient taking differently: Take 20 mg by mouth daily.        ) 60 Tab 0    clonazePAM (KLONOPIN) 1 mg tablet Take 1.5 Tabs by mouth nightly. Max Daily Amount: 1.5 mg. 45 Tab 3    amLODIPine (NORVASC) 10 mg tablet TAKE 1 TABLET BY MOUTH EVERY DAY  11    lamoTRIgine (LAMICTAL) 200 mg tablet TAKE 1 TABLET BY MOUTH EVERY DAY  5    diclofenac EC (VOLTAREN) 75 mg EC tablet TAKE 1 TABLET (75 MG TOTAL) BY MOUTH 2 (TWO) TIMES A DAY. TAKE WITH FOOD  2    SAPHRIS 10 mg subl 1 Tab by SubLINGual route nightly. 30 Tab 5    LORazepam (ATIVAN) 1 mg tablet Take  by mouth every four (4) hours as needed for Anxiety.  rosuvastatin (CRESTOR) 40 mg tablet Take 40 mg by mouth nightly. NON FORMULARY       lisinopril-hydrochlorothiazide (PRINZIDE, ZESTORETIC) 20-12.5 mg per tablet TAKE 2 TABLETS EVERY DAY 60 Tab 4    atorvastatin (LIPITOR) 80 mg tablet Take 1 Tab by mouth daily. 30 Tab 6    fluticasone (FLONASE) 50 mcg/Actuation nasal spray USE 2 SPRAYS IN EACH NOSTRIL DAILY 1 Bottle 11    metoprolol-XL (TOPROL XL) 100 mg XL tablet Take 1 Tab by mouth daily. 30 Tab 12    naproxen sodium (ALEVE) 220 mg tablet Take 220 mg by mouth daily (with breakfast). Indications: MILD ARTHRITIC PAIN, back; 2 qam      aspirin 81 mg Tab Take  by mouth.  cpap machine kit by Does Not Apply route.  montelukast (SINGULAIR) 10 mg tablet Take 10 mg by mouth daily. The following regarding medications was addressed:    (The risks, benefits of the proposed medication and the potential medication side effects ie dry mouth, weight gain, GI upset, headache). The patient was given the opportunity to ask questions. The patient was informed of the consequences of refusing medications and non-compliance. 2.  Counseling and coordination of care including instructions for treatment, risks/benefits, risk factor reduction and patient/family education. He agrees with the plan. 3.Patient instructed to call with any side effects, questions or issues.      PSYCHOTHERAPY:  approx 25 minutes  Supportive BUT EXPLORED REASONS FOR THE WAY HIS FAMILY IS TREATING HIM, HOW DID HE GET THERE AND WHAT HE CAN DO TO ALTER THAT. HE AGREED TO LOOK INTO IT. Homework given regarding:NONE   Psychoeducation with handouts provided:  . Also signed the Controlled substance agreement. UDS ordered. Mr. Sunny Mason has a reminder for a \"due or due soon\" health maintenance. I have asked that he contact his primary care provider for follow-up on this health maintenance. Miya Mack is  progressing. Follow-up Disposition:  Return in about 3 months (around 4/17/2019).       Trisha Bolanos MD  1/17/2019

## 2019-01-19 LAB
AMPHETAMINES UR QL SCN: NEGATIVE NG/ML
BARBITURATES UR QL SCN: NEGATIVE NG/ML
BENZODIAZ UR QL: NEGATIVE NG/ML
BZE UR QL: NEGATIVE NG/ML
CANNABINOIDS UR QL SCN: NEGATIVE NG/ML
MDMA UR QL SCN: NEGATIVE NG/ML
METHADONE UR QL SCN: NEGATIVE NG/ML
METHAQUALONE UR QL: NEGATIVE NG/ML
OPIATES UR QL: NEGATIVE NG/ML
PCP UR QL: NEGATIVE NG/ML
PROPOXYPH UR QL SCN: NEGATIVE NG/ML

## 2019-02-01 ENCOUNTER — TELEPHONE (OUTPATIENT)
Dept: BEHAVIORAL/MENTAL HEALTH CLINIC | Age: 64
End: 2019-02-01

## 2019-02-04 DIAGNOSIS — F51.05 INSOMNIA DISORDER, WITH NON-SLEEP DISORDER MENTAL COMORBIDITY, RECURRENT: Primary | ICD-10-CM

## 2019-02-04 RX ORDER — TRAZODONE HYDROCHLORIDE 100 MG/1
100 TABLET ORAL
Qty: 30 TAB | Refills: 2 | Status: SHIPPED | OUTPATIENT
Start: 2019-02-04 | End: 2019-04-16 | Stop reason: DRUGHIGH

## 2019-02-04 RX ORDER — LAMOTRIGINE 200 MG/1
TABLET ORAL
Qty: 30 TAB | Refills: 5 | Status: SHIPPED | OUTPATIENT
Start: 2019-02-04 | End: 2019-07-29 | Stop reason: SDUPTHER

## 2019-03-29 DIAGNOSIS — R45.1 AGITATION: Primary | ICD-10-CM

## 2019-03-29 RX ORDER — LORAZEPAM 1 MG/1
TABLET ORAL
Qty: 45 TAB | Refills: 0 | Status: SHIPPED | OUTPATIENT
Start: 2019-03-29 | End: 2019-05-15 | Stop reason: SDUPTHER

## 2019-03-29 NOTE — TELEPHONE ENCOUNTER
Pt called wanting to know why his Lorazepam was denied.  was checked and it was last refilled on 11/12/18 pharmacist from [facility] also confirmed last filled date.

## 2019-04-16 ENCOUNTER — OFFICE VISIT (OUTPATIENT)
Dept: BEHAVIORAL/MENTAL HEALTH CLINIC | Age: 64
End: 2019-04-16

## 2019-04-16 VITALS
BODY MASS INDEX: 30.44 KG/M2 | HEIGHT: 76 IN | DIASTOLIC BLOOD PRESSURE: 67 MMHG | SYSTOLIC BLOOD PRESSURE: 121 MMHG | HEART RATE: 78 BPM | WEIGHT: 250 LBS

## 2019-04-16 DIAGNOSIS — F10.280 ALCOHOL DEPENDENCE WITH ALCOHOL-INDUCED ANXIETY DISORDER (HCC): Primary | ICD-10-CM

## 2019-04-16 DIAGNOSIS — F10.14 ALCOHOL ABUSE WITH ALCOHOL-INDUCED MOOD DISORDER (HCC): ICD-10-CM

## 2019-04-16 DIAGNOSIS — F51.05 INSOMNIA DISORDER, WITH NON-SLEEP DISORDER MENTAL COMORBIDITY, PERSISTENT: ICD-10-CM

## 2019-04-16 DIAGNOSIS — F33.9 EPISODE OF RECURRENT MAJOR DEPRESSIVE DISORDER, UNSPECIFIED DEPRESSION EPISODE SEVERITY (HCC): ICD-10-CM

## 2019-04-16 DIAGNOSIS — G47.30 INSOMNIA WITH SLEEP APNEA: ICD-10-CM

## 2019-04-16 DIAGNOSIS — F41.9 ANXIETY: ICD-10-CM

## 2019-04-16 DIAGNOSIS — G47.00 INSOMNIA WITH SLEEP APNEA: ICD-10-CM

## 2019-04-16 RX ORDER — CLONAZEPAM 1 MG/1
1.5 TABLET ORAL
Qty: 45 TAB | Refills: 3 | Status: SHIPPED | OUTPATIENT
Start: 2019-04-16 | End: 2019-08-07 | Stop reason: SDUPTHER

## 2019-04-16 RX ORDER — ASENAPINE 5 MG/1
5 TABLET SUBLINGUAL
Qty: 30 TAB | Refills: 3 | Status: SHIPPED | OUTPATIENT
Start: 2019-04-16 | End: 2019-08-07

## 2019-04-16 RX ORDER — DEXTROAMPHETAMINE SACCHARATE, AMPHETAMINE ASPARTATE, DEXTROAMPHETAMINE SULFATE AND AMPHETAMINE SULFATE 5; 5; 5; 5 MG/1; MG/1; MG/1; MG/1
20 TABLET ORAL 2 TIMES DAILY
Qty: 60 TAB | Refills: 0 | Status: SHIPPED | OUTPATIENT
Start: 2019-04-16 | End: 2019-08-07 | Stop reason: SDUPTHER

## 2019-04-16 RX ORDER — TOPIRAMATE 50 MG/1
150 TABLET, FILM COATED ORAL DAILY
Qty: 90 TAB | Refills: 3 | Status: SHIPPED | OUTPATIENT
Start: 2019-04-16 | End: 2019-08-07 | Stop reason: SDUPTHER

## 2019-04-16 RX ORDER — TRAZODONE HYDROCHLORIDE 150 MG/1
150 TABLET ORAL
Qty: 30 TAB | Refills: 3 | Status: SHIPPED | OUTPATIENT
Start: 2019-04-16 | End: 2019-08-07 | Stop reason: SDUPTHER

## 2019-04-16 RX ORDER — ASENAPINE MALEATE 10 MG/1
TABLET SUBLINGUAL
Refills: 5 | COMMUNITY
Start: 2019-03-30 | End: 2019-04-16 | Stop reason: DRUGHIGH

## 2019-04-16 NOTE — PROGRESS NOTES
Psychiatric Outpatient Progress Note    Account Number:  [id number]  Name: Kuldeep Romo    SUBJECTIVE:     CHIEF COMPLAINT:    HPI:  Kuldeep Romo is a 59 y.o. male and was seen today for follow-up of psychiatric condition and psychotropic medication management. Mr. Alvin Quinn is a 62 y/o  (2nd for 29 years)( father of 2) [address], employed since March at [employer] He was  a patient of Dr. Pinky Aldana whose care was transferred to this clinic. He was receiving treatment for Bipolar affective disorder and ADHD, alcohol use disorder and was on Lorazepam 1.5mg/day, Klonopin 1.5mg at hs,Lamictal 200mg at hs, Saphris SL 10mg daily,Lexapro 20mg and Adderall 20mg bid   He has been depressed since the 90s,following loss of business and filing for bankruptcy. He lost his job over 1.5 years ago and was unemployed for 1 year. He was fired because of major errors he made at work. He endorsed all the symptoms of depression on the Geriatricscale, which are currently active despite the 6 psychotropics he is prescribed. He continues to drink alcohol on a regular basis, more than a bottle of wine.    He is not suicidal and never has been   He has marital friction ( wife is s/p lingual CA for which she had radiation treatment and has difficulty with articulation)           PRECIPITATING FACTORS:loss of business,filing for bankruptcy in the 90s but more recent,he was fired from his job 1&1/2 y/ago ( [employer]) because he made errors and was reprimanded.     COPING METHODS:  Internalize,drink alcohol     PHQ-9 scores/Geriatric depression scale:12/15 ( unhappy,dissatisfied,not active, empty,bored,fearful, helpless,isolating,memory problems,lack of energy,others are better off)  HAM-A scores:17/56 ( anxious,tense,memory deficits,dep, problems)  Mood Disorder Questionaire scores:8/13( irritable,racing thoughts,distractible,increased sex,spending,risky behavior)        Course of events since last visit: IN the last Visit, the  Saphris 10mg, Klonopin 1.5mg at hs, Lexapro and Adderall 20mg bid ( prn when needed at work) were renewed but he had called and requested to discontinue the Saphris which was and Trazadone was added. Today, he reports taking the Saphris because he could not sleep and yet sleeps 8 hours/night, \" I need 9 hours\". He is still drinking alcohol but claims to be drinking only 14/week. He remains frustrated with his son but is doing well at work. Plans to visit his therapist more often due to new insurance rules/absence of co-pays for [facility] visits. He is seeing Nalini Gillespie on an individual basis. Established problems: alcohol  Use, depression, anxiety, insomnia, anhedonia  New Problems: problems with son/family    R/O EARLY DRUG INDUCED PARKINSONIAN SYMPTOMS    Patient denies SI/HI/SIB     Side Effects:  none      Fam/Social changes: Finally got a raise in his base pay which has provided more hope for him. REVIEW OF SYSTEMS:  Pertinent items are noted in HPI. Visit Vitals  /67   Pulse 78   Ht 6' 3.5\" (1.918 m)   Wt 113.4 kg (250 lb)   BMI 30.84 kg/m²       OBJECTIVE:                 Mental Status exam: WNL except for      Attitude/Behavior Guarded but cooperative, shy    Eye Contact    appropriate   Appearance:  age appropriate and well dressed   Motor Behavior/Gait:  Normal PM activity   Speech:  hypoverbal and soft   Thought Process: goal directed and logical   Thought Content free of delusions and free of hallucinations   Perceptual distortions  Patient denied any visual,auditory,olfactory or gustatory hallucinations.  No illusions were reported or noted eithter   Suicidal ideations no plan  and no intention   Homicidal ideations no plan  and no intention   Mood:  anxious and depressed   Affect:  Dysphoric but smiled more     Orientation/sensorium  Alert and oriented to  date,place, situation and persons   Memory recent  adequate   Memory remote:  adequate   Concentration:  adequate Abstraction:  abstract   Insight:  good   Reliability good   Judgment:  good       MEDICAL DECISION MAKING:  Reviewed records from [address]. MAINLY LABS PERTAINING TO UA, CBCD,CMP , ALL OF WHICH WERE NORMAL AS OF LIZETH. 3,2019    Data: pertinent labs, imaging, medical records and diagnostic tests reviewed and incorporated in diagnosis and treatment plan    Allergies   Allergen Reactions    Levaquin [Levofloxacin] Unknown (comments)     Pt states he is no allergic  10/2/18 [facility]-LPN        Current Outpatient Medications   Medication Sig Dispense Refill    traZODone (DESYREL) 150 mg tablet Take 1 Tab by mouth nightly. 30 Tab 3    topiramate (TOPAMAX) 50 mg tablet Take 3 Tabs by mouth daily. 90 Tab 3    asenapine (SAPHRIS) 5 mg subl subLINGual tablet 1 Tab by SubLINGual route nightly. 30 Tab 3    clonazePAM (KLONOPIN) 1 mg tablet Take 1.5 Tabs by mouth nightly. Max Daily Amount: 1.5 mg. 45 Tab 3    dextroamphetamine-amphetamine (ADDERALL) 20 mg tablet Take 1 Tab by mouth two (2) times a day. Max Daily Amount: 40 mg. 60 Tab 0    LORazepam (ATIVAN) 1 mg tablet Take 1 1/2 daily in the am. 45 Tab 0    lamoTRIgine (LAMICTAL) 200 mg tablet TAKE 1 TABLET BY MOUTH EVERY DAY 30 Tab 5    tamsulosin (FLOMAX) 0.4 mg capsule Take 0.4 mg by mouth daily.  escitalopram oxalate (LEXAPRO) 20 mg tablet Take 1 Tab by mouth daily. 30 Tab 5    amLODIPine (NORVASC) 10 mg tablet TAKE 1 TABLET BY MOUTH EVERY DAY  11    diclofenac EC (VOLTAREN) 75 mg EC tablet TAKE 1 TABLET (75 MG TOTAL) BY MOUTH 2 (TWO) TIMES A DAY. TAKE WITH FOOD  2    rosuvastatin (CRESTOR) 40 mg tablet Take 40 mg by mouth nightly. NON FORMULARY       lisinopril-hydrochlorothiazide (PRINZIDE, ZESTORETIC) 20-12.5 mg per tablet TAKE 2 TABLETS EVERY DAY 60 Tab 4    fluticasone (FLONASE) 50 mcg/Actuation nasal spray USE 2 SPRAYS IN EACH NOSTRIL DAILY 1 Bottle 11    cpap machine kit by Does Not Apply route.       metoprolol-XL (TOPROL XL) 100 mg XL tablet Take 1 Tab by mouth daily. 30 Tab 12    aspirin 81 mg Tab Take  by mouth.  atorvastatin (LIPITOR) 80 mg tablet Take 1 Tab by mouth daily. 30 Tab 6    naproxen sodium (ALEVE) 220 mg tablet Take 220 mg by mouth daily (with breakfast). Indications: MILD ARTHRITIC PAIN, back; 2 qam      montelukast (SINGULAIR) 10 mg tablet Take 10 mg by mouth daily.                     ICD-10-CM ICD-9-CM    1. Alcohol dependence with alcohol-induced anxiety disorder (HCC) F10.280 303.90 topiramate (TOPAMAX) 50 mg tablet     291.89    2. Alcohol abuse with alcohol-induced mood disorder (HCC) F10.14 291.89 topiramate (TOPAMAX) 50 mg tablet      asenapine (SAPHRIS) 5 mg subl subLINGual tablet   3. Insomnia with sleep apnea G47.00 780.51     G47.30     4. Episode of recurrent major depressive disorder, unspecified depression episode severity ([facility]) F33.9 296.30 asenapine (SAPHRIS) 5 mg subl subLINGual tablet      dextroamphetamine-amphetamine (ADDERALL) 20 mg tablet   5. Anxiety F41.9 300.00 clonazePAM (KLONOPIN) 1 mg tablet   6. Insomnia disorder, with non-sleep disorder mental comorbidity, persistent G47.00 780.52 traZODone (DESYREL) 150 mg tablet       Orders Placed This Encounter    traZODone (DESYREL) 150 mg tablet     Sig: Take 1 Tab by mouth nightly. Dispense:  30 Tab     Refill:  3    topiramate (TOPAMAX) 50 mg tablet     Sig: Take 3 Tabs by mouth daily. Dispense:  90 Tab     Refill:  3    asenapine (SAPHRIS) 5 mg subl subLINGual tablet     Si Tab by SubLINGual route nightly. Dispense:  30 Tab     Refill:  3    clonazePAM (KLONOPIN) 1 mg tablet     Sig: Take 1.5 Tabs by mouth nightly. Max Daily Amount: 1.5 mg.     Dispense:  45 Tab     Refill:  3    dextroamphetamine-amphetamine (ADDERALL) 20 mg tablet     Sig: Take 1 Tab by mouth two (2) times a day. Max Daily Amount: 40 mg.      Dispense:  60 Tab     Refill:  0           Assessment:   Aidan Mejia  is a 59 y.o.  male  is  responding to treatment since he has BEEN cutting down on ALCOHOL use. Insomnia and his son remain major problem areas. PHQ-9: 12/27 ---same as before 12/27  HAM-A: 14/56 --- Was 17/56 before  Mood Dis.ques:0/13,---- was 8/13 before      Patient denies SI/HI/SIB. No evidence of AH/VH or delusions. Treatment PlanTreatment plan reviewed with patient-including diagnosis and medications:            Medication Changes/Adjustments: Will renew Topiramate 150mg at hs of which he has 1 refill left. He has ample refills of other medications. ADVISED TO TAKE LESS SAPHRIS(FROM 10 TO 5MG ) TO PREVENT PARKINSONIAN SYMPTOMS. Current Outpatient Medications   Medication Sig Dispense Refill    traZODone (DESYREL) 150 mg tablet Take 1 Tab by mouth nightly. 30 Tab 3    topiramate (TOPAMAX) 50 mg tablet Take 3 Tabs by mouth daily. 90 Tab 3    asenapine (SAPHRIS) 5 mg subl subLINGual tablet 1 Tab by SubLINGual route nightly. 30 Tab 3    clonazePAM (KLONOPIN) 1 mg tablet Take 1.5 Tabs by mouth nightly. Max Daily Amount: 1.5 mg. 45 Tab 3    dextroamphetamine-amphetamine (ADDERALL) 20 mg tablet Take 1 Tab by mouth two (2) times a day. Max Daily Amount: 40 mg. 60 Tab 0    LORazepam (ATIVAN) 1 mg tablet Take 1 1/2 daily in the am. 45 Tab 0    lamoTRIgine (LAMICTAL) 200 mg tablet TAKE 1 TABLET BY MOUTH EVERY DAY 30 Tab 5    tamsulosin (FLOMAX) 0.4 mg capsule Take 0.4 mg by mouth daily.  escitalopram oxalate (LEXAPRO) 20 mg tablet Take 1 Tab by mouth daily. 30 Tab 5    amLODIPine (NORVASC) 10 mg tablet TAKE 1 TABLET BY MOUTH EVERY DAY  11    diclofenac EC (VOLTAREN) 75 mg EC tablet TAKE 1 TABLET (75 MG TOTAL) BY MOUTH 2 (TWO) TIMES A DAY. TAKE WITH FOOD  2    rosuvastatin (CRESTOR) 40 mg tablet Take 40 mg by mouth nightly.  NON FORMULARY       lisinopril-hydrochlorothiazide (PRINZIDE, ZESTORETIC) 20-12.5 mg per tablet TAKE 2 TABLETS EVERY DAY 60 Tab 4    fluticasone (FLONASE) 50 mcg/Actuation nasal spray USE 2 SPRAYS IN EACH NOSTRIL DAILY 1 Bottle 11    cpap machine kit by Does Not Apply route.  metoprolol-XL (TOPROL XL) 100 mg XL tablet Take 1 Tab by mouth daily. 30 Tab 12    aspirin 81 mg Tab Take  by mouth.  atorvastatin (LIPITOR) 80 mg tablet Take 1 Tab by mouth daily. 30 Tab 6    naproxen sodium (ALEVE) 220 mg tablet Take 220 mg by mouth daily (with breakfast). Indications: MILD ARTHRITIC PAIN, back; 2 qam      montelukast (SINGULAIR) 10 mg tablet Take 10 mg by mouth daily. The following regarding medications was addressed:    (The risks, benefits of the proposed medication and the potential medication side effects ie dry mouth, weight gain, GI upset, headache). The patient was given the opportunity to ask questions. The patient was informed of the consequences of refusing medications and non-compliance. 2.  Counseling and coordination of care including instructions for treatment, risks/benefits, risk factor reduction and patient/family education. He agrees with the plan. 3.Patient instructed to call with any side effects, questions or issues. PSYCHOTHERAPY:  approx 25 minutes  Supportive BUT EXPLORED REASONS FOR THE WAY HIS FAMILY IS TREATING HIM, HOW DID HE GET THERE AND WHAT HE CAN DO TO ALTER THAT. HE AGREED TO LOOK INTO IT. Homework given regarding:NONE   Psychoeducation with handouts provided:  . Also signed the Controlled substance agreement. UDS ordered. Mr. Rayna Guevara has a reminder for a \"due or due soon\" health maintenance. I have asked that he contact his primary care provider for follow-up on this health maintenance. Luis Muñoz is  progressing. Follow-up and Dispositions    · Return in about 3 months (around 7/16/2019).            Obie Gamino MD  4/16/2019

## 2019-05-15 DIAGNOSIS — R45.1 AGITATION: ICD-10-CM

## 2019-05-15 RX ORDER — LORAZEPAM 1 MG/1
TABLET ORAL
Qty: 45 TAB | Refills: 0 | Status: SHIPPED | OUTPATIENT
Start: 2019-05-15 | End: 2019-08-07 | Stop reason: SDUPTHER

## 2019-07-29 RX ORDER — LAMOTRIGINE 200 MG/1
TABLET ORAL
Qty: 30 TAB | Refills: 5 | Status: SHIPPED | OUTPATIENT
Start: 2019-07-29 | End: 2019-10-23 | Stop reason: SDUPTHER

## 2019-07-30 DIAGNOSIS — F33.9 EPISODE OF RECURRENT MAJOR DEPRESSIVE DISORDER, UNSPECIFIED DEPRESSION EPISODE SEVERITY (HCC): ICD-10-CM

## 2019-07-30 RX ORDER — ESCITALOPRAM OXALATE 20 MG/1
TABLET ORAL
Qty: 30 TAB | Refills: 5 | Status: SHIPPED | OUTPATIENT
Start: 2019-07-30

## 2019-08-07 ENCOUNTER — OFFICE VISIT (OUTPATIENT)
Dept: BEHAVIORAL/MENTAL HEALTH CLINIC | Age: 64
End: 2019-08-07

## 2019-08-07 VITALS
HEIGHT: 76 IN | HEART RATE: 61 BPM | SYSTOLIC BLOOD PRESSURE: 110 MMHG | WEIGHT: 241 LBS | DIASTOLIC BLOOD PRESSURE: 67 MMHG | BODY MASS INDEX: 29.35 KG/M2

## 2019-08-07 DIAGNOSIS — F10.14 ALCOHOL ABUSE WITH ALCOHOL-INDUCED MOOD DISORDER (HCC): Primary | ICD-10-CM

## 2019-08-07 DIAGNOSIS — F41.9 ANXIETY: ICD-10-CM

## 2019-08-07 DIAGNOSIS — F43.29 ADJUSTMENT DISORDER WITH MIXED EMOTIONAL FEATURES: ICD-10-CM

## 2019-08-07 DIAGNOSIS — F10.280 ALCOHOL DEPENDENCE WITH ALCOHOL-INDUCED ANXIETY DISORDER (HCC): ICD-10-CM

## 2019-08-07 DIAGNOSIS — F51.05 INSOMNIA DISORDER, WITH NON-SLEEP DISORDER MENTAL COMORBIDITY, PERSISTENT: ICD-10-CM

## 2019-08-07 DIAGNOSIS — F33.9 EPISODE OF RECURRENT MAJOR DEPRESSIVE DISORDER, UNSPECIFIED DEPRESSION EPISODE SEVERITY (HCC): ICD-10-CM

## 2019-08-07 RX ORDER — EZETIMIBE 10 MG/1
TABLET ORAL
Refills: 3 | COMMUNITY
Start: 2019-07-25

## 2019-08-07 RX ORDER — DEXTROAMPHETAMINE SACCHARATE, AMPHETAMINE ASPARTATE, DEXTROAMPHETAMINE SULFATE AND AMPHETAMINE SULFATE 5; 5; 5; 5 MG/1; MG/1; MG/1; MG/1
20 TABLET ORAL 2 TIMES DAILY
Qty: 60 TAB | Refills: 0 | Status: SHIPPED | OUTPATIENT
Start: 2019-08-07 | End: 2019-10-23 | Stop reason: SDUPTHER

## 2019-08-07 RX ORDER — ESOMEPRAZOLE MAGNESIUM 40 MG/1
CAPSULE, DELAYED RELEASE ORAL
Refills: 3 | COMMUNITY
Start: 2019-07-06

## 2019-08-07 RX ORDER — CLONAZEPAM 1 MG/1
1.5 TABLET ORAL
Qty: 45 TAB | Refills: 3 | Status: SHIPPED | OUTPATIENT
Start: 2019-08-07 | End: 2019-10-23 | Stop reason: SDUPTHER

## 2019-08-07 RX ORDER — TRAZODONE HYDROCHLORIDE 150 MG/1
150 TABLET ORAL
Qty: 30 TAB | Refills: 3 | Status: SHIPPED | OUTPATIENT
Start: 2019-08-07 | End: 2019-10-23 | Stop reason: SDUPTHER

## 2019-08-07 RX ORDER — TOPIRAMATE 50 MG/1
150 TABLET, FILM COATED ORAL DAILY
Qty: 90 TAB | Refills: 3 | Status: SHIPPED | OUTPATIENT
Start: 2019-08-07 | End: 2019-09-19 | Stop reason: SINTOL

## 2019-08-07 NOTE — PROGRESS NOTES
Psychiatric Outpatient Progress Note    Account Number:  [id number]  Name: James Chow    SUBJECTIVE:     CHIEF COMPLAINT:    HPI:  James Chow is a 59 y.o. male and was seen today for follow-up of psychiatric condition and psychotropic medication management. Mr. Ruby Shen is a 62 y/o  (2nd for 29 years)( father of 2) [address], employed since March at [employer] He was  a patient of Dr. Abhilash Buck whose care was transferred to this clinic. He was receiving treatment for Bipolar affective disorder and ADHD, alcohol use disorder and was on Lorazepam 1.5mg/day, Klonopin 1.5mg at hs,Lamictal 200mg at hs, Saphris SL 10mg daily,Lexapro 20mg and Adderall 20mg bid   He has been depressed since the 90s,following loss of business and filing for bankruptcy. He lost his job over 1.5 years ago and was unemployed for 1 year. He was fired because of major errors he made at work. He endorsed all the symptoms of depression on the Geriatricscale, which are currently active despite the 6 psychotropics he is prescribed. He continues to drink alcohol on a regular basis, more than a bottle of wine.    He is not suicidal and never has been   He has marital friction ( wife is s/p lingual CA for which she had radiation treatment and has difficulty with articulation)           PRECIPITATING FACTORS:loss of business,filing for bankruptcy in the 90s but more recent,he was fired from his job 1&1/2 y/ago ( [employer]) because he made errors and was reprimanded.     COPING METHODS:  Internalize,drink alcohol     PHQ-9 scores/Geriatric depression scale:12/15 ( unhappy,dissatisfied,not active, empty,bored,fearful, helpless,isolating,memory problems,lack of energy,others are better off)  HAM-A scores:17/56 ( anxious,tense,memory deficits,dep, problems)  Mood Disorder Questionaire scores:8/13( irritable,racing thoughts,distractible,increased sex,spending,risky behavior)    ------------------------------------------------------------------------------------------------------------------------------------------------------------------------------------------------------------------------------------------------------------------------------------------------    Course of events since last visit: Mr. Aftab Meyer returns for his scheduled appointment, very depressed and anxious because of financial problems related to the \"new position he took\". He has not received any commission for the past few months. He has lost his motivation and feels quite worried and anxious. IN the last  Visit, the  Saphris 10mg, Klonopin 1.5mg at hs, Lexapro and Adderall 20mg bid ( prn when needed at work) were renewed   He is seeing Sheryl Wren on an individual basis. Established problems: alcohol  Use, depression, anxiety, insomnia, anhedonia  New Problems: problems with son/family    R/O EARLY DRUG INDUCED PARKINSONIAN SYMPTOMS    Patient denies SI/HI/SIB     Side Effects:  none      Fam/Social changes: Financial problems persist    Pertinent items are noted in HPI. Visit Vitals  /67   Pulse 61   Ht 6' 3.5\" (1.918 m)   Wt 109.3 kg (241 lb)   BMI 29.73 kg/m²       OBJECTIVE:                 Mental Status exam: WNL except for      Attitude/Behavior More flexible and animated    Eye Contact    appropriate   Appearance:  age appropriate and well dressed   Motor Behavior/Gait:  Normal PM activity   Speech:  Normal latency and pitch,volume   Thought Process: goal directed and logical   Thought Content free of delusions and free of hallucinations   Perceptual distortions  Patient denied any visual,auditory,olfactory or gustatory hallucinations.  No illusions were reported or noted eithter   Suicidal ideations no plan  and no intention   Homicidal ideations no plan  and no intention   Mood:  anxious and depressed (situational)   Affect:  Dysphoric but smiled more     Orientation/sensorium  Alert and oriented to  date,place, situation and persons   Memory recent  adequate   Memory remote:  adequate   Concentration:  adequate   Abstraction:  abstract   Insight:  good   Reliability good   Judgment:  good       MEDICAL DECISION MAKING:  Reviewed records from [facility]. MAINLY LABS PERTAINING TO UA, CBCD,CMP , ALL OF WHICH WERE NORMAL AS OF LIZETH. 3,2019    Data: pertinent labs, imaging, medical records and diagnostic tests reviewed and incorporated in diagnosis and treatment plan    Allergies   Allergen Reactions    Levaquin [Levofloxacin] Unknown (comments)     Pt states he is no allergic  10/2/18 [facility]        Current Outpatient Medications   Medication Sig Dispense Refill    esomeprazole (NEXIUM) 40 mg capsule TAKE 1 (ONE) CAPSULE BY MOUTH DAILY 2 HOURS BEFORE DINNER  3    ezetimibe (ZETIA) 10 mg tablet TAKE 1 TABLET BY MOUTH EVERY DAY  3    topiramate (TOPAMAX) 50 mg tablet Take 3 Tabs by mouth daily. 90 Tab 3    clonazePAM (KLONOPIN) 1 mg tablet Take 1.5 Tabs by mouth nightly. Max Daily Amount: 1.5 mg. 45 Tab 3    traZODone (DESYREL) 150 mg tablet Take 1 Tab by mouth nightly. 30 Tab 3    dextroamphetamine-amphetamine (ADDERALL) 20 mg tablet Take 1 Tab by mouth two (2) times a day. Max Daily Amount: 40 mg. 60 Tab 0    escitalopram oxalate (LEXAPRO) 20 mg tablet TAKE 1 TABLET BY MOUTH EVERY DAY 30 Tab 5    lamoTRIgine (LAMICTAL) 200 mg tablet TAKE 1 TABLET BY MOUTH EVERY DAY 30 Tab 5    tamsulosin (FLOMAX) 0.4 mg capsule Take 0.4 mg by mouth daily.  amLODIPine (NORVASC) 10 mg tablet TAKE 1 TABLET BY MOUTH EVERY DAY  11    diclofenac EC (VOLTAREN) 75 mg EC tablet TAKE 1 TABLET (75 MG TOTAL) BY MOUTH 2 (TWO) TIMES A DAY. TAKE WITH FOOD  2    rosuvastatin (CRESTOR) 40 mg tablet Take 40 mg by mouth nightly.  NON FORMULARY       lisinopril-hydrochlorothiazide (PRINZIDE, ZESTORETIC) 20-12.5 mg per tablet TAKE 2 TABLETS EVERY DAY 60 Tab 4    fluticasone (FLONASE) 50 mcg/Actuation nasal spray USE 2 SPRAYS IN EACH NOSTRIL DAILY 1 Bottle 11    cpap machine kit by Does Not Apply route.  metoprolol-XL (TOPROL XL) 100 mg XL tablet Take 1 Tab by mouth daily. 30 Tab 12    naproxen sodium (ALEVE) 220 mg tablet Take 220 mg by mouth daily (with breakfast). Indications: MILD ARTHRITIC PAIN, back; 2 qam      aspirin 81 mg Tab Take  by mouth.  atorvastatin (LIPITOR) 80 mg tablet Take 1 Tab by mouth daily. 30 Tab 6    montelukast (SINGULAIR) 10 mg tablet Take 10 mg by mouth daily.                     ICD-10-CM ICD-9-CM    1. Alcohol abuse with alcohol-induced mood disorder (HCC) F10.14 291.89 topiramate (TOPAMAX) 50 mg tablet   2. Adjustment disorder with mixed emotional features F43.29 309.9    3. Alcohol dependence with alcohol-induced anxiety disorder (HCC) F10.280 303.90 topiramate (TOPAMAX) 50 mg tablet     291.89    4. Anxiety F41.9 300.00 clonazePAM (KLONOPIN) 1 mg tablet   5. Insomnia disorder, with non-sleep disorder mental comorbidity, persistent G47.00 780.52 traZODone (DESYREL) 150 mg tablet   6. Episode of recurrent major depressive disorder, unspecified depression episode severity ([facility]) F33.9 296.30 dextroamphetamine-amphetamine (ADDERALL) 20 mg tablet       Orders Placed This Encounter    topiramate (TOPAMAX) 50 mg tablet     Sig: Take 3 Tabs by mouth daily. Dispense:  90 Tab     Refill:  3    clonazePAM (KLONOPIN) 1 mg tablet     Sig: Take 1.5 Tabs by mouth nightly. Max Daily Amount: 1.5 mg.     Dispense:  45 Tab     Refill:  3    traZODone (DESYREL) 150 mg tablet     Sig: Take 1 Tab by mouth nightly. Dispense:  30 Tab     Refill:  3    dextroamphetamine-amphetamine (ADDERALL) 20 mg tablet     Sig: Take 1 Tab by mouth two (2) times a day. Max Daily Amount: 40 mg. Dispense:  60 Tab     Refill:  0           Assessment:   Scar Chow  is a 59 y.o.  male  is  responding to treatment since he has BEEN cutting down on ALCOHOL use.  His sleep and mood are better, current situation is distressing him, including his neighbor who may be dying from Leukemia    PHQ-9: 12/27 ---same as before 12/27  HAM-A: 14/56 --- Was 17/56 before  Mood Dis.ques:0/13,---- was 8/13 before      Patient denies SI/HI/SIB. No evidence of AH/VH or delusions. Treatment PlanTreatment plan reviewed with patient-including diagnosis and medications:            Medication Changes/Adjustments: Will renew all his medications. No changes. Current Outpatient Medications   Medication Sig Dispense Refill    esomeprazole (NEXIUM) 40 mg capsule TAKE 1 (ONE) CAPSULE BY MOUTH DAILY 2 HOURS BEFORE DINNER  3    ezetimibe (ZETIA) 10 mg tablet TAKE 1 TABLET BY MOUTH EVERY DAY  3    topiramate (TOPAMAX) 50 mg tablet Take 3 Tabs by mouth daily. 90 Tab 3    clonazePAM (KLONOPIN) 1 mg tablet Take 1.5 Tabs by mouth nightly. Max Daily Amount: 1.5 mg. 45 Tab 3    traZODone (DESYREL) 150 mg tablet Take 1 Tab by mouth nightly. 30 Tab 3    dextroamphetamine-amphetamine (ADDERALL) 20 mg tablet Take 1 Tab by mouth two (2) times a day. Max Daily Amount: 40 mg. 60 Tab 0    escitalopram oxalate (LEXAPRO) 20 mg tablet TAKE 1 TABLET BY MOUTH EVERY DAY 30 Tab 5    lamoTRIgine (LAMICTAL) 200 mg tablet TAKE 1 TABLET BY MOUTH EVERY DAY 30 Tab 5    tamsulosin (FLOMAX) 0.4 mg capsule Take 0.4 mg by mouth daily.  amLODIPine (NORVASC) 10 mg tablet TAKE 1 TABLET BY MOUTH EVERY DAY  11    diclofenac EC (VOLTAREN) 75 mg EC tablet TAKE 1 TABLET (75 MG TOTAL) BY MOUTH 2 (TWO) TIMES A DAY. TAKE WITH FOOD  2    rosuvastatin (CRESTOR) 40 mg tablet Take 40 mg by mouth nightly. NON FORMULARY       lisinopril-hydrochlorothiazide (PRINZIDE, ZESTORETIC) 20-12.5 mg per tablet TAKE 2 TABLETS EVERY DAY 60 Tab 4    fluticasone (FLONASE) 50 mcg/Actuation nasal spray USE 2 SPRAYS IN EACH NOSTRIL DAILY 1 Bottle 11    cpap machine kit by Does Not Apply route.  metoprolol-XL (TOPROL XL) 100 mg XL tablet Take 1 Tab by mouth daily.  30 Tab 12    naproxen sodium (ALEVE) 220 mg tablet Take 220 mg by mouth daily (with breakfast). Indications: MILD ARTHRITIC PAIN, back; 2 qam      aspirin 81 mg Tab Take  by mouth.  atorvastatin (LIPITOR) 80 mg tablet Take 1 Tab by mouth daily. 30 Tab 6    montelukast (SINGULAIR) 10 mg tablet Take 10 mg by mouth daily. The following regarding medications was addressed:    (The risks, benefits of the proposed medication and the potential medication side effects ie dry mouth, weight gain, GI upset, headache). The patient was given the opportunity to ask questions. The patient was informed of the consequences of refusing medications and non-compliance. 2.  Counseling and coordination of care including instructions for treatment, risks/benefits, risk factor reduction and patient/family education. He agrees with the plan. 3.Patient instructed to call with any side effects, questions or issues. PSYCHOTHERAPY:  approx 25 minutes  Supportive, encouraged him and explored alternative means of obtaining extra dollars, he also ventilated about his neighbor who is dying. BUT EXPLORED REASONS FOR THE WAY HIS FAMILY IS TREATING HIM, HOW DID HE GET THERE AND WHAT HE CAN DO TO ALTER THAT. HE AGREED TO LOOK INTO IT. Homework given regarding:NONE   Psychoeducation with handouts provided:  . Also signed the Controlled substance agreement. UDS ordered. Mr. Jarod Messer has a reminder for a \"due or due soon\" health maintenance. I have asked that he contact his primary care provider for follow-up on this health maintenance. Joleen Alcantara is  progressing.         Darron Grimes MD  8/7/2019

## 2019-08-26 ENCOUNTER — TELEPHONE (OUTPATIENT)
Dept: BEHAVIORAL/MENTAL HEALTH CLINIC | Age: 64
End: 2019-08-26

## 2019-08-26 NOTE — TELEPHONE ENCOUNTER
Pt called in stating he would like to get back on the lorazepam that you took him off of. He stated you gave him Addreall in its place and he has stopped the Adderall two months ago. Pt stated he started back taking the lorazepam 1 mg 1.5 during the day and that he is still taking the clonazepam 1mg 1.5 at night.

## 2019-09-19 ENCOUNTER — OFFICE VISIT (OUTPATIENT)
Dept: BEHAVIORAL/MENTAL HEALTH CLINIC | Age: 64
End: 2019-09-19

## 2019-09-19 VITALS
WEIGHT: 227 LBS | HEART RATE: 60 BPM | DIASTOLIC BLOOD PRESSURE: 65 MMHG | HEIGHT: 76 IN | SYSTOLIC BLOOD PRESSURE: 98 MMHG | BODY MASS INDEX: 27.64 KG/M2

## 2019-09-19 DIAGNOSIS — F33.0 DEPRESSION, MAJOR, RECURRENT, MILD (HCC): ICD-10-CM

## 2019-09-19 DIAGNOSIS — R53.83 OTHER FATIGUE: ICD-10-CM

## 2019-09-19 DIAGNOSIS — R63.4 WEIGHT LOSS, UNINTENTIONAL: Primary | ICD-10-CM

## 2019-09-19 RX ORDER — DULOXETIN HYDROCHLORIDE 30 MG/1
30 CAPSULE, DELAYED RELEASE ORAL
Qty: 30 CAP | Refills: 2 | Status: SHIPPED | OUTPATIENT
Start: 2019-09-19 | End: 2019-12-16 | Stop reason: SDUPTHER

## 2019-09-19 NOTE — PROGRESS NOTES
Psychiatric Outpatient Progress Note    Account Number:  [id number]  Name: Maria L Kruse    SUBJECTIVE:     CHIEF COMPLAINT:    HPI:  Maria L Kruse is a 59 y.o. male and was seen today for follow-up of psychiatric condition and psychotropic medication management. Mr. Maria Guadalupe García is a 60 y/o  (2nd for 29 years)( father of 2) [address], employed since March at [employer] He was  a patient of Dr. Dimitris Bolanos whose care was transferred to this clinic. He was receiving treatment for Bipolar affective disorder and ADHD, alcohol use disorder and was on Lorazepam 1.5mg/day, Klonopin 1.5mg at hs,Lamictal 200mg at hs, Saphris SL 10mg daily,Lexapro 20mg and Adderall 20mg bid   He has been depressed since the 90s,following loss of business and filing for bankruptcy. He lost his job over 1.5 years ago and was unemployed for 1 year. He was fired because of major errors he made at work. He endorsed all the symptoms of depression on the Geriatricscale, which are currently active despite the 6 psychotropics he is prescribed. He continues to drink alcohol on a regular basis, more than a bottle of wine.    He is not suicidal and never has been   He has marital friction ( wife is s/p lingual CA for which she had radiation treatment and has difficulty with articulation)     Outpatient Psychiatric treatments:He has worked with  iGo Juárez, his therapist is Nahomi Saba  Suicide attempts/self inflictive behaviors:never  Hospitalizations:  [facility]  [facility] for one day over 20 years ago ( locked self in br)  Medications Tried:  Celexa,Abilify,Ambien cR 6.25, Seroquel, Zoloft, Effexor  ECT:   None   Legal/Assault History:  Nil        PRECIPITATING FACTORS:loss of business,filing for bankruptcy in the 90s but more recent,he was fired from his job 1&1/2 y/ago ( [employer]) because he made errors and was reprimanded.     COPING METHODS:  Internalize,drink alcohol     PHQ-9 scores/Geriatric depression scale:12/15 ( unhappy,dissatisfied,not active, empty,bored,fearful, helpless,isolating,memory problems,lack of energy,others are better off)  HAM-A scores:17/56 ( anxious,tense,memory deficits,dep, problems)  Mood Disorder Questionaire scores:8/13( irritable,racing thoughts,distractible,increased sex,spending,risky behavior)      ------------------------------------------------------------------------------------------------------------------------------------------------------------------------------------------------------------------------------------------------------------------------------------------------    Course of events since last visit: Mr. Ginny Caballero returns for his scheduled appointment, very depressed and anxious because of financial problems related to the \"new position he took\", HIS NEIGHBOR , HIS FRIEND'S WIFE HAS RECURRENT CANCER AND HE HAS BEEN FALLING OUT OF THE BED, UNABLE TO COORDINATE HIS [address],  Has been tired, stressed, dizzy and found to be dehydrated with postural hypotension. He has lost over 20 lbs in past two months unintentionally. He has not received any commission for the past few months. He has lost his motivation and feels quite worried and anxious. IN the PAST TWO MONTHS,THE Saphris 10mg WAS DISCONTINUED AS WAS ATIVAN. Klonopin 1.5mg at hs, Lexapro and Adderall 20mg bid ( prn when needed at work) were renewed   He is seeing Adiel Martinez on an individual basis. Established problems: alcohol  Use, depression, anxiety, insomnia, anhedonia  New Problems: problems with son/family    R/O EARLY DRUG INDUCED PARKINSONIAN SYMPTOMS, residual TD from discontinuing the Saphris. Patient denies SI/HI/SIB     Side Effects:  none      Fam/Social changes: Financial problems persist    Pertinent items are noted in HPI.     Visit Vitals  BP 98/65   Pulse 60   Ht 6' 3.5\" (1.918 m)   Wt 103 kg (227 lb)   BMI 28.00 kg/m²       OBJECTIVE:                 Mental Status exam: WNL except for      Attitude/Behavior More flexible and animated    Eye Contact    appropriate   Appearance:  age appropriate and well dressed   Motor Behavior/Gait:  Normal PM activity   Speech:  Normal latency and pitch,volume   Thought Process: goal directed and logical   Thought Content free of delusions and free of hallucinations   Perceptual distortions  Patient denied any visual,auditory,olfactory or gustatory hallucinations. No illusions were reported or noted eithter   Suicidal ideations no plan  and no intention   Homicidal ideations no plan  and no intention   Mood:  anxious and depressed (situational)   Affect:  Dysphoric but smiled more     Orientation/sensorium  Alert and oriented to  date,place, situation and persons   Memory recent  adequate   Memory remote:  adequate   Concentration:  adequate   Abstraction:  abstract   Insight:  good   Reliability good   Judgment:  good       MEDICAL DECISION MAKING:  Reviewed records from [facility]. MAINLY LABS PERTAINING TO UA, CBCD,CMP , ALL OF WHICH WERE NORMAL AS OF LIZETH. 3,2019    Data: pertinent labs, imaging, medical records and diagnostic tests reviewed and incorporated in diagnosis and treatment plan    Allergies   Allergen Reactions    Levaquin [Levofloxacin] Unknown (comments)     Pt states he is no allergic  10/2/18 [facility]        Current Outpatient Medications   Medication Sig Dispense Refill    DULoxetine (CYMBALTA) 30 mg capsule Take 1 Cap by mouth nightly. 30 Cap 2    esomeprazole (NEXIUM) 40 mg capsule TAKE 1 (ONE) CAPSULE BY MOUTH DAILY 2 HOURS BEFORE DINNER  3    ezetimibe (ZETIA) 10 mg tablet TAKE 1 TABLET BY MOUTH EVERY DAY  3    clonazePAM (KLONOPIN) 1 mg tablet Take 1.5 Tabs by mouth nightly. Max Daily Amount: 1.5 mg. 45 Tab 3    traZODone (DESYREL) 150 mg tablet Take 1 Tab by mouth nightly. 30 Tab 3    dextroamphetamine-amphetamine (ADDERALL) 20 mg tablet Take 1 Tab by mouth two (2) times a day.  Max Daily Amount: 40 mg. 60 Tab 0  escitalopram oxalate (LEXAPRO) 20 mg tablet TAKE 1 TABLET BY MOUTH EVERY DAY 30 Tab 5    lamoTRIgine (LAMICTAL) 200 mg tablet TAKE 1 TABLET BY MOUTH EVERY DAY 30 Tab 5    amLODIPine (NORVASC) 10 mg tablet 5 mg. 11    rosuvastatin (CRESTOR) 40 mg tablet Take 40 mg by mouth nightly. NON FORMULARY       lisinopril-hydrochlorothiazide (PRINZIDE, ZESTORETIC) 20-12.5 mg per tablet TAKE 2 TABLETS EVERY DAY 60 Tab 4    cpap machine kit by Does Not Apply route.  metoprolol-XL (TOPROL XL) 100 mg XL tablet Take 1 Tab by mouth daily. 30 Tab 12    naproxen sodium (ALEVE) 220 mg tablet Take 220 mg by mouth daily (with breakfast). Indications: MILD ARTHRITIC PAIN, back; 2 qam      aspirin 81 mg Tab Take  by mouth.  tamsulosin (FLOMAX) 0.4 mg capsule Take 0.4 mg by mouth daily.  diclofenac EC (VOLTAREN) 75 mg EC tablet TAKE 1 TABLET (75 MG TOTAL) BY MOUTH 2 (TWO) TIMES A DAY. TAKE WITH FOOD  2    atorvastatin (LIPITOR) 80 mg tablet Take 1 Tab by mouth daily. 30 Tab 6    fluticasone (FLONASE) 50 mcg/Actuation nasal spray USE 2 SPRAYS IN EACH NOSTRIL DAILY 1 Bottle 11    montelukast (SINGULAIR) 10 mg tablet Take 10 mg by mouth daily.                     ICD-10-CM ICD-9-CM    1. Weight loss, unintentional R63.4 783.21    2. Depression, major, recurrent, mild (HCC) F33.0 296.31 DULoxetine (CYMBALTA) 30 mg capsule   3. Other fatigue R53.83 780.79        Orders Placed This Encounter    DULoxetine (CYMBALTA) 30 mg capsule     Sig: Take 1 Cap by mouth nightly. Dispense:  30 Cap     Refill:  2           Assessment:   Michael Schwab  is a 59 y.o.  male  is  responding to treatment since he has abstinent from ALCOHOL use. He has been taking Topiramate which COULD BE CAUSING HIS SYMTOMS MENTIONED ABOVE. His sleep and mood are better, current situation is distressing him, including his neighbor who may be dying from Leukemia    PHQ-9: 12/27 ---same as before 12/27  HAM-A: 14/56 --- Was 17/56 before  Mood Dis.ques:0/13,---- was 8/13 before      Patient denies SI/HI/SIB. No evidence of AH/VH or delusions. Treatment PlanTreatment plan reviewed with patient-including diagnosis and medications:            Medication Changes/Adjustments: Will GRADUALLY DISCONTINUE ALL TOPIRAMATE OVER 8 DAYS, PLACE ON DULOXETINE FOR BETTER CONTROL OF ANXIETY AND DEPRESSION . hE HAS AMPLE SUPPLY OF [facility] [place]. Current Outpatient Medications   Medication Sig Dispense Refill    DULoxetine (CYMBALTA) 30 mg capsule Take 1 Cap by mouth nightly. 30 Cap 2    esomeprazole (NEXIUM) 40 mg capsule TAKE 1 (ONE) CAPSULE BY MOUTH DAILY 2 HOURS BEFORE DINNER  3    ezetimibe (ZETIA) 10 mg tablet TAKE 1 TABLET BY MOUTH EVERY DAY  3    clonazePAM (KLONOPIN) 1 mg tablet Take 1.5 Tabs by mouth nightly. Max Daily Amount: 1.5 mg. 45 Tab 3    traZODone (DESYREL) 150 mg tablet Take 1 Tab by mouth nightly. 30 Tab 3    dextroamphetamine-amphetamine (ADDERALL) 20 mg tablet Take 1 Tab by mouth two (2) times a day. Max Daily Amount: 40 mg. 60 Tab 0    escitalopram oxalate (LEXAPRO) 20 mg tablet TAKE 1 TABLET BY MOUTH EVERY DAY 30 Tab 5    lamoTRIgine (LAMICTAL) 200 mg tablet TAKE 1 TABLET BY MOUTH EVERY DAY 30 Tab 5    amLODIPine (NORVASC) 10 mg tablet 5 mg. 11    rosuvastatin (CRESTOR) 40 mg tablet Take 40 mg by mouth nightly. NON FORMULARY       lisinopril-hydrochlorothiazide (PRINZIDE, ZESTORETIC) 20-12.5 mg per tablet TAKE 2 TABLETS EVERY DAY 60 Tab 4    cpap machine kit by Does Not Apply route.  metoprolol-XL (TOPROL XL) 100 mg XL tablet Take 1 Tab by mouth daily. 30 Tab 12    naproxen sodium (ALEVE) 220 mg tablet Take 220 mg by mouth daily (with breakfast). Indications: MILD ARTHRITIC PAIN, back; 2 qam      aspirin 81 mg Tab Take  by mouth.  tamsulosin (FLOMAX) 0.4 mg capsule Take 0.4 mg by mouth daily.       diclofenac EC (VOLTAREN) 75 mg EC tablet TAKE 1 TABLET (75 MG TOTAL) BY MOUTH 2 (TWO) TIMES A DAY. TAKE WITH FOOD  2    atorvastatin (LIPITOR) 80 mg tablet Take 1 Tab by mouth daily. 30 Tab 6    fluticasone (FLONASE) 50 mcg/Actuation nasal spray USE 2 SPRAYS IN EACH NOSTRIL DAILY 1 Bottle 11    montelukast (SINGULAIR) 10 mg tablet Take 10 mg by mouth daily. The following regarding medications was addressed:    (The risks, benefits of the proposed medication and the potential medication side effects ie dry mouth, weight gain, GI upset, headache). The patient was given the opportunity to ask questions. The patient was informed of the consequences of refusing medications and non-compliance. 2.  Counseling and coordination of care including instructions for treatment, risks/benefits, risk factor reduction and patient/family education. He agrees with the plan. 3.Patient instructed to call with any side effects, questions or issues. PSYCHOTHERAPY:  approx 25 minutes  Supportive, encouraged him and explored alternative means of obtaining extra dollars, he also ventilated about his neighbor who is dying. BUT EXPLORED REASONS FOR THE WAY HIS FAMILY IS TREATING HIM, HOW DID HE GET THERE AND WHAT HE CAN DO TO ALTER THAT. HE AGREED TO LOOK INTO IT. Homework given regarding:NONE   Psychoeducation with handouts provided:  . Also signed the Controlled substance agreement. UDS ordered. Has appointment for Oct. 9th. Mr. Torres Garcia has a reminder for a \"due or due soon\" health maintenance. I have asked that he contact his primary care provider for follow-up on this health maintenance. Gokul Johnson is  progressing. Follow-up and Dispositions    · Return in about 5 weeks (around 10/24/2019).        Face to face encounter time: 25minutes    Pillo Vail MD  9/19/2019

## 2019-09-19 NOTE — PATIENT INSTRUCTIONS
PLEASE TAKE ONLY 2 TABLETS OF TOPIRAMATE FOR 4 NIGHTS, THEN TAKE 1 TABLET NIGHTLY     FOR 4 NIGHTS AND STOP/DISCONTINUE ALL TOPIRAMATE

## 2019-10-23 ENCOUNTER — OFFICE VISIT (OUTPATIENT)
Dept: BEHAVIORAL/MENTAL HEALTH CLINIC | Age: 64
End: 2019-10-23

## 2019-10-23 VITALS
OXYGEN SATURATION: 98 % | DIASTOLIC BLOOD PRESSURE: 81 MMHG | HEIGHT: 75 IN | BODY MASS INDEX: 27.85 KG/M2 | HEART RATE: 65 BPM | WEIGHT: 224 LBS | SYSTOLIC BLOOD PRESSURE: 129 MMHG

## 2019-10-23 DIAGNOSIS — F33.0 DEPRESSION, MAJOR, RECURRENT, MILD (HCC): Primary | ICD-10-CM

## 2019-10-23 DIAGNOSIS — F51.05 INSOMNIA DISORDER, WITH NON-SLEEP DISORDER MENTAL COMORBIDITY, PERSISTENT: ICD-10-CM

## 2019-10-23 DIAGNOSIS — F41.9 ANXIETY: ICD-10-CM

## 2019-10-23 DIAGNOSIS — F10.14 ALCOHOL ABUSE WITH ALCOHOL-INDUCED MOOD DISORDER (HCC): ICD-10-CM

## 2019-10-23 DIAGNOSIS — F33.9 EPISODE OF RECURRENT MAJOR DEPRESSIVE DISORDER, UNSPECIFIED DEPRESSION EPISODE SEVERITY (HCC): ICD-10-CM

## 2019-10-23 RX ORDER — TRAZODONE HYDROCHLORIDE 150 MG/1
150 TABLET ORAL
Qty: 30 TAB | Refills: 3 | Status: SHIPPED | OUTPATIENT
Start: 2019-10-23

## 2019-10-23 RX ORDER — LAMOTRIGINE 200 MG/1
TABLET ORAL
Qty: 30 TAB | Refills: 5 | Status: SHIPPED | OUTPATIENT
Start: 2019-10-23

## 2019-10-23 RX ORDER — DEXTROAMPHETAMINE SACCHARATE, AMPHETAMINE ASPARTATE, DEXTROAMPHETAMINE SULFATE AND AMPHETAMINE SULFATE 5; 5; 5; 5 MG/1; MG/1; MG/1; MG/1
20 TABLET ORAL 2 TIMES DAILY
Qty: 60 TAB | Refills: 0 | Status: SHIPPED | OUTPATIENT
Start: 2019-10-23

## 2019-10-23 RX ORDER — CLONAZEPAM 1 MG/1
1.5 TABLET ORAL
Qty: 45 TAB | Refills: 3 | Status: SHIPPED | OUTPATIENT
Start: 2019-10-23

## 2019-10-23 NOTE — PATIENT INSTRUCTIONS
1-Take 1/2 tablet of the Lexapro (Escitalopram)for the next two weeks and call me.     2-PLEASE RESUME YOUR ADDERALL AS BEFORE. YOU MAY CUT IN 1/2 TABLET IF CONCERNED ABOUT YOUR BLOOD PRESSURE

## 2019-10-23 NOTE — PROGRESS NOTES
Psychiatric Outpatient Progress Note    Account Number:  [id number]  Name: Ayo Rice    SUBJECTIVE:     CHIEF COMPLAINT:    HPI:  Ayo Rice is a 59 y.o. male and was seen today for follow-up of psychiatric condition and psychotropic medication management. Mr. Beth Blancas is a 60 y/o  (2nd for 29 years)( father of 2) [address], employed since March at [employer] He was  a patient of Dr. Viet Tony whose care was transferred to this clinic. He was receiving treatment for Bipolar affective disorder and ADHD, alcohol use disorder and was on Lorazepam 1.5mg/day, Klonopin 1.5mg at hs,Lamictal 200mg at hs, Saphris SL 10mg daily,Lexapro 20mg and Adderall 20mg bid   He has been depressed since the 90s,following loss of business and filing for bankruptcy. He lost his job over 1.5 years ago and was unemployed for 1 year. He was fired because of major errors he made at work. He endorsed all the symptoms of depression on the Geriatricscale, which are currently active despite the 6 psychotropics he is prescribed. He continues to drink alcohol on a regular basis, more than a bottle of wine.    He is not suicidal and never has been   He has marital friction ( wife is s/p lingual CA for which she had radiation treatment and has difficulty with articulation)     Outpatient Psychiatric treatments:He has worked with  Vilma Mulligan, his therapist is Albina Martínez  Suicide attempts/self inflictive behaviors:never  Hospitalizations:  [facility]  [facility] for one day over 20 years ago ( locked self in br)  Medications Tried:  Celexa,Abilify,Ambien cR 6.25, Seroquel, Zoloft, Effexor  ECT:   None   Legal/Assault History:  Nil        PRECIPITATING FACTORS:loss of business,filing for bankruptcy in the 90s but more recent,he was fired from his job 1&1/2 y/ago ( [employer]) because he made errors and was reprimanded.     COPING METHODS:  Internalize,drink alcohol     PHQ-9 scores/Geriatric depression scale:12/15 ( unhappy,dissatisfied,not active, empty,bored,fearful, helpless,isolating,memory problems,lack of energy,others are better off)  HAM-A scores:17/56 ( anxious,tense,memory deficits,dep, problems)  Mood Disorder Questionaire scores:8/13( irritable,racing thoughts,distractible,increased sex,spending,risky behavior)      ------------------------------------------------------------------------------------------------------------------------------------------------------------------------------------------------------------------------------------------------------------------------------------------------    Course of events since last visit: Mr. Michael Cox returns for his scheduled appointment, very depressed WITH FATIGUE AND POOR CONCENTRATION, LOSING DRIVE TO GO TO WORK. CONCERNED ABOUT HIS HEALTH, KIDNEYS/ANEMIA,ETC. He stopped taking his Adderall in September. HAS BEEN SEEING HIS PCP FREQUENTLY. His employer has been more accommodating. He ventilated his frustrations with the nephrologist.     Of NOTE THE Saphris 10mg WAS DISCONTINUED AS WAS ATIVAN. IN the last visit, the Topiramate was also stopped due to his weight loss and Duloxetine added for anxiety. He is seeing Coferonsee on an individual basis. NO EPS NOR TD SIGNS NOTED THIS VISIT. Patient denies SI/HI/SIB     Side Effects:  none      Fam/Social changes: Financial problems persist but to a lesser degree    Pertinent items are noted in HPI.     Visit Vitals  /81 (BP 1 Location: Right arm, BP Patient Position: Sitting)   Pulse 65   Ht 6' 3\" (1.905 m)   Wt 101.6 kg (224 lb)   SpO2 98%   BMI 28.00 kg/m²       OBJECTIVE:                 Mental Status exam: WNL except for      Attitude/Behavior More flexible and animated    Eye Contact    appropriate   Appearance:  age appropriate and well dressed   Motor Behavior/Gait:  Normal PM activity   Speech:  Normal latency and pitch,volume   Thought Process: goal directed and logical   Thought Content free of delusions and free of hallucinations   Perceptual distortions  Patient denied any visual,auditory,olfactory or gustatory hallucinations. No illusions were reported or noted eithter   Suicidal ideations no plan  and no intention   Homicidal ideations no plan  and no intention   Mood:  anxious and depressed (situational)   Affect:  Blunted and dysphoric, appropriate to the situation     Orientation/sensorium  Alert and oriented to  date,place, situation and persons   Memory recent  adequate   Memory remote:  adequate   Concentration:  adequate   Abstraction:  abstract   Insight:  good   Reliability good   Judgment:  good       MEDICAL DECISION MAKING:  Reviewed records from [facility]. MAINLY LABS PERTAINING TO UA, CBCD,CMP , ALL OF WHICH WERE NORMAL AS OF OCT,2019    Data: pertinent labs, imaging, medical records and diagnostic tests reviewed and incorporated in diagnosis and treatment plan    Allergies   Allergen Reactions    Levaquin [Levofloxacin] Unknown (comments)     Pt states he is no allergic  10/2/18 LRG-LPN        Current Outpatient Medications   Medication Sig Dispense Refill    clonazePAM (KLONOPIN) 1 mg tablet Take 1.5 Tabs by mouth nightly. Max Daily Amount: 1.5 mg. 45 Tab 3    dextroamphetamine-amphetamine (ADDERALL) 20 mg tablet Take 1 Tab by mouth two (2) times a day. Max Daily Amount: 40 mg. 60 Tab 0    traZODone (DESYREL) 150 mg tablet Take 1 Tab by mouth nightly. 30 Tab 3    lamoTRIgine (LAMICTAL) 200 mg tablet TAKE 1 TABLET BY MOUTH EVERY DAY 30 Tab 5    DULoxetine (CYMBALTA) 30 mg capsule Take 1 Cap by mouth nightly. 30 Cap 2    ezetimibe (ZETIA) 10 mg tablet TAKE 1 TABLET BY MOUTH EVERY DAY  3    escitalopram oxalate (LEXAPRO) 20 mg tablet TAKE 1 TABLET BY MOUTH EVERY DAY 30 Tab 5    amLODIPine (NORVASC) 10 mg tablet 5 mg. 11    rosuvastatin (CRESTOR) 40 mg tablet Take 40 mg by mouth nightly.  NON FORMULARY       atorvastatin (LIPITOR) 80 mg tablet Take 1 Tab by mouth daily. 30 Tab 6    cpap machine kit by Does Not Apply route.  metoprolol-XL (TOPROL XL) 100 mg XL tablet Take 1 Tab by mouth daily. 30 Tab 12    aspirin 81 mg Tab Take  by mouth.  esomeprazole (NEXIUM) 40 mg capsule TAKE 1 (ONE) CAPSULE BY MOUTH DAILY 2 HOURS BEFORE DINNER  3    tamsulosin (FLOMAX) 0.4 mg capsule Take 0.4 mg by mouth daily.  diclofenac EC (VOLTAREN) 75 mg EC tablet TAKE 1 TABLET (75 MG TOTAL) BY MOUTH 2 (TWO) TIMES A DAY. TAKE WITH FOOD  2    lisinopril-hydrochlorothiazide (PRINZIDE, ZESTORETIC) 20-12.5 mg per tablet TAKE 2 TABLETS EVERY DAY 60 Tab 4    fluticasone (FLONASE) 50 mcg/Actuation nasal spray USE 2 SPRAYS IN EACH NOSTRIL DAILY 1 Bottle 11    naproxen sodium (ALEVE) 220 mg tablet Take 220 mg by mouth daily (with breakfast). Indications: MILD ARTHRITIC PAIN, back; 2 qam      montelukast (SINGULAIR) 10 mg tablet Take 10 mg by mouth daily.                     ICD-10-CM ICD-9-CM    1. Depression, major, recurrent, mild (HCC) F33.0 296.31    2. Alcohol abuse with alcohol-induced mood disorder ([facility] F10.14 291.89    3. Anxiety F41.9 300.00 clonazePAM (KLONOPIN) 1 mg tablet   4. Episode of recurrent major depressive disorder, unspecified depression episode severity (HCC) F33.9 296.30 dextroamphetamine-amphetamine (ADDERALL) 20 mg tablet   5. Insomnia disorder, with non-sleep disorder mental comorbidity, persistent G47.00 780.52 traZODone (DESYREL) 150 mg tablet       Orders Placed This Encounter    clonazePAM (KLONOPIN) 1 mg tablet     Sig: Take 1.5 Tabs by mouth nightly. Max Daily Amount: 1.5 mg.     Dispense:  45 Tab     Refill:  3    dextroamphetamine-amphetamine (ADDERALL) 20 mg tablet     Sig: Take 1 Tab by mouth two (2) times a day. Max Daily Amount: 40 mg. Dispense:  60 Tab     Refill:  0    traZODone (DESYREL) 150 mg tablet     Sig: Take 1 Tab by mouth nightly.      Dispense:  30 Tab     Refill:  3    lamoTRIgine (LAMICTAL) 200 mg tablet     Sig: TAKE 1 TABLET BY MOUTH EVERY DAY     Dispense:  30 Tab     Refill:  5           Assessment:   Enzo Goltz  is a 59 y.o.  male  is  responding to treatment since he has abstained from ALCOHOL use for past 2 months. PHQ-9: 12/27 ---same as before 12/27  HAM-A: 14/56 --- Was 17/56 before  Mood Dis.ques:0/13,---- was 8/13 before      Patient denies SI/HI/SIB. No evidence of AH/VH or delusions. Treatment PlanTreatment plan reviewed with patient-including diagnosis and medications:            Medication Changes/Adjustments:      HE WAS ADVISED TO CUT THE  LEXAPRO 20MG TO 10MG  AND CONTINUE THE ADDERALL WITH KLONOPIN, DULOXETINE, AND LAMICTAL WHICH WERE RENEWED. HE IS TAKING MULTIVITAMINS WHICH WERE ENCOURAGED. Current Outpatient Medications   Medication Sig Dispense Refill    clonazePAM (KLONOPIN) 1 mg tablet Take 1.5 Tabs by mouth nightly. Max Daily Amount: 1.5 mg. 45 Tab 3    dextroamphetamine-amphetamine (ADDERALL) 20 mg tablet Take 1 Tab by mouth two (2) times a day. Max Daily Amount: 40 mg. 60 Tab 0    traZODone (DESYREL) 150 mg tablet Take 1 Tab by mouth nightly. 30 Tab 3    lamoTRIgine (LAMICTAL) 200 mg tablet TAKE 1 TABLET BY MOUTH EVERY DAY 30 Tab 5    DULoxetine (CYMBALTA) 30 mg capsule Take 1 Cap by mouth nightly. 30 Cap 2    ezetimibe (ZETIA) 10 mg tablet TAKE 1 TABLET BY MOUTH EVERY DAY  3    escitalopram oxalate (LEXAPRO) 20 mg tablet TAKE 1 TABLET BY MOUTH EVERY DAY 30 Tab 5    amLODIPine (NORVASC) 10 mg tablet 5 mg. 11    rosuvastatin (CRESTOR) 40 mg tablet Take 40 mg by mouth nightly. NON FORMULARY       atorvastatin (LIPITOR) 80 mg tablet Take 1 Tab by mouth daily. 30 Tab 6    cpap machine kit by Does Not Apply route.  metoprolol-XL (TOPROL XL) 100 mg XL tablet Take 1 Tab by mouth daily. 30 Tab 12    aspirin 81 mg Tab Take  by mouth.       esomeprazole (NEXIUM) 40 mg capsule TAKE 1 (ONE) CAPSULE BY MOUTH DAILY 2 HOURS BEFORE DINNER  3    tamsulosin (FLOMAX) 0.4 mg capsule Take 0.4 mg by mouth daily.  diclofenac EC (VOLTAREN) 75 mg EC tablet TAKE 1 TABLET (75 MG TOTAL) BY MOUTH 2 (TWO) TIMES A DAY. TAKE WITH FOOD  2    lisinopril-hydrochlorothiazide (PRINZIDE, ZESTORETIC) 20-12.5 mg per tablet TAKE 2 TABLETS EVERY DAY 60 Tab 4    fluticasone (FLONASE) 50 mcg/Actuation nasal spray USE 2 SPRAYS IN EACH NOSTRIL DAILY 1 Bottle 11    naproxen sodium (ALEVE) 220 mg tablet Take 220 mg by mouth daily (with breakfast). Indications: MILD ARTHRITIC PAIN, back; 2 qam      montelukast (SINGULAIR) 10 mg tablet Take 10 mg by mouth daily. The following regarding medications was addressed:    (The risks, benefits of the proposed medication and the potential medication side effects ie dry mouth, weight gain, GI upset, headache). The patient was given the opportunity to ask questions. The patient was informed of the consequences of refusing medications and non-compliance. 2.  Counseling and coordination of care including instructions for treatment, risks/benefits, risk factor reduction and patient/family education. He agrees with the plan. 3.Patient instructed to call with any side effects, questions or issues. PSYCHOTHERAPY:  approx 25 minutes  Supportive TYPE AS HIS CONCERNS AND OBSERVATIONS WERE VALIDATED, COMMENDED FOR ABSTINENCE WHICH WAS ENCOURAGED. Hortencia Mcmillan Homework given regarding:NONE   Psychoeducation with handouts provided:  . Also signed the Controlled substance agreement. UDS ordered. Mr. Desiree Lange has a reminder for a \"due or due soon\" health maintenance. I have asked that he contact his primary care provider for follow-up on this health maintenance. Aicha He is  Progressing   WITH SOME SETBACKS AS NOTED ABOVE. Follow-up and Dispositions    · Return in about 6 weeks (around 12/4/2019).        Face to face encounter time: 25minutes    Rico Crabtree MD  10/23/2019

## 2019-10-23 NOTE — PROGRESS NOTES
Omar Madera is a 59 y.o. male    Chief Complaint   Patient presents with    Follow-up    Establish Care     Blood pressure 129/81, pulse 65, height 6' 3\" (1.905 m), weight 224 lb (101.6 kg), SpO2 98 %. 1. Have you been to the ER, urgent care clinic since your last visit? Hospitalized since your last visit? No      2. Have you seen or consulted any other health care providers outside of the [address] [place] [address] since your last visit? Include any pap smears or colon screening.  No

## 2019-10-28 ENCOUNTER — OFFICE VISIT (OUTPATIENT)
Dept: DERMATOLOGY | Facility: AMBULATORY SURGERY CENTER | Age: 64
End: 2019-10-28

## 2019-10-28 VITALS
RESPIRATION RATE: 16 BRPM | DIASTOLIC BLOOD PRESSURE: 78 MMHG | HEIGHT: 75 IN | TEMPERATURE: 98.7 F | OXYGEN SATURATION: 97 % | HEART RATE: 71 BPM | SYSTOLIC BLOOD PRESSURE: 132 MMHG | BODY MASS INDEX: 27.98 KG/M2 | WEIGHT: 225 LBS

## 2019-10-28 DIAGNOSIS — D22.9 MULTIPLE BENIGN NEVI: ICD-10-CM

## 2019-10-28 DIAGNOSIS — D23.9 DERMATOFIBROMA: ICD-10-CM

## 2019-10-28 DIAGNOSIS — D18.01 CHERRY ANGIOMA: ICD-10-CM

## 2019-10-28 DIAGNOSIS — L81.4 LENTIGINES: ICD-10-CM

## 2019-10-28 DIAGNOSIS — Z87.2 HISTORY OF NEVUS EXCISION: Primary | ICD-10-CM

## 2019-10-28 DIAGNOSIS — Z98.890 HISTORY OF NEVUS EXCISION: Primary | ICD-10-CM

## 2019-10-28 DIAGNOSIS — L82.1 SEBORRHEIC KERATOSES: ICD-10-CM

## 2019-10-28 DIAGNOSIS — Z87.898 HISTORY OF ATYPICAL NEVUS: ICD-10-CM

## 2019-10-28 DIAGNOSIS — L73.8 SEBACEOUS HYPERPLASIA OF FACE: ICD-10-CM

## 2019-10-28 NOTE — PROGRESS NOTES
Written by Marcie Castaneda, as dictated by Jorge Lara Grammes, [id number]. Name: Liliya Cerna       Age: 59 y.o. Date: 10/28/2019    Chief Complaint:   Chief Complaint   Patient presents with    Skin Exam     full skin exam       Subjective:    HPI  Mr. Liliya Cerna is a 59 y.o. male who presents for a full skin exam.  The patient's last skin exam was on 10/2/18 and the patient does have current complaints related to his skin. He states that he has had a bump on the right side of his nose for past two years. He denies any changes in the size of the bump or any symptoms to include bleeding. 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 Dermatological : positive for - skin lesion changes    Social History     Socioeconomic History    Marital status: SINGLE     Spouse name: Not on file    Number of children: Not on file    Years of education: Not on file    Highest education level: Not on file   Occupational History    Not on file   Social Needs    Financial resource strain: Not on file    Food insecurity:     Worry: Not on file     Inability: Not on file    Transportation needs:     Medical: Not on file     Non-medical: Not on file   Tobacco Use    Smoking status: Former Smoker     Packs/day: 0.50     Years: 20.00     Pack years: 10.00     Last attempt to quit: 2002     Years since quittin.9    Smokeless tobacco: Never Used    Tobacco comment:    Substance and Sexual Activity    Alcohol use:  Yes     Alcohol/week: 12.5 standard drinks     Types: 15 Standard drinks or equivalent per week     Comment: everyday    Drug use: No    Sexual activity: Not on file   Lifestyle    Physical activity:     Days per week: Not on file     Minutes per session: Not on file    Stress: Not on file Relationships    Social connections:     Talks on phone: Not on file     Gets together: Not on file     Attends [employer] service: Not on file     Active member of club or organization: Not on file     Attends meetings of clubs or organizations: Not on file     Relationship status: Not on file    Intimate partner violence:     Fear of current or ex partner: Not on file     Emotionally abused: Not on file     Physically abused: Not on file     Forced sexual activity: Not on file   Other Topics Concern    Not on file   Social History Narrative    Not on file       Family History   Problem Relation Age of Onset    Hypertension Father     Diabetes Sister        Past Medical History:   Diagnosis Date    Allergic rhinitis, cause unspecified     CAD (coronary artery disease) 11/02    DMI    Depression     Family history of skin cancer     mother    Hypercholesterolemia     Hypertension     EDISON (obstructive sleep apnea)     using CPAP    Sunburn, blistering        Past Surgical History:   Procedure Laterality Date    HX HEART CATHETERIZATION  11/02    HX LUMBAR LAMINECTOMY  89    HX ORTHOPAEDIC      lumbar hnp    HX ORTHOPAEDIC      carpel tunnel       Current Outpatient Medications   Medication Sig Dispense Refill    clonazePAM (KLONOPIN) 1 mg tablet Take 1.5 Tabs by mouth nightly. Max Daily Amount: 1.5 mg. 45 Tab 3    dextroamphetamine-amphetamine (ADDERALL) 20 mg tablet Take 1 Tab by mouth two (2) times a day. Max Daily Amount: 40 mg. 60 Tab 0    traZODone (DESYREL) 150 mg tablet Take 1 Tab by mouth nightly. 30 Tab 3    lamoTRIgine (LAMICTAL) 200 mg tablet TAKE 1 TABLET BY MOUTH EVERY DAY 30 Tab 5    DULoxetine (CYMBALTA) 30 mg capsule Take 1 Cap by mouth nightly. 30 Cap 2    ezetimibe (ZETIA) 10 mg tablet TAKE 1 TABLET BY MOUTH EVERY DAY  3    escitalopram oxalate (LEXAPRO) 20 mg tablet TAKE 1 TABLET BY MOUTH EVERY DAY 30 Tab 5    amLODIPine (NORVASC) 10 mg tablet 5 mg.   11    rosuvastatin (CRESTOR) 40 mg tablet Take 40 mg by mouth nightly. NON FORMULARY       atorvastatin (LIPITOR) 80 mg tablet Take 1 Tab by mouth daily. 30 Tab 6    fluticasone (FLONASE) 50 mcg/Actuation nasal spray USE 2 SPRAYS IN EACH NOSTRIL DAILY 1 Bottle 11    cpap machine kit by Does Not Apply route.  metoprolol-XL (TOPROL XL) 100 mg XL tablet Take 1 Tab by mouth daily. 30 Tab 12    naproxen sodium (ALEVE) 220 mg tablet Take 220 mg by mouth daily (with breakfast). Indications: MILD ARTHRITIC PAIN, back; 2 qam      aspirin 81 mg Tab Take  by mouth.  esomeprazole (NEXIUM) 40 mg capsule TAKE 1 (ONE) CAPSULE BY MOUTH DAILY 2 HOURS BEFORE DINNER  3    tamsulosin (FLOMAX) 0.4 mg capsule Take 0.4 mg by mouth daily.  diclofenac EC (VOLTAREN) 75 mg EC tablet TAKE 1 TABLET (75 MG TOTAL) BY MOUTH 2 (TWO) TIMES A DAY. TAKE WITH FOOD  2    lisinopril-hydrochlorothiazide (PRINZIDE, ZESTORETIC) 20-12.5 mg per tablet TAKE 2 TABLETS EVERY DAY 60 Tab 4    montelukast (SINGULAIR) 10 mg tablet Take 10 mg by mouth daily. Allergies   Allergen Reactions    Levaquin [Levofloxacin] Unknown (comments)     Pt states he is no allergic  10/2/18 LRG-LPN         Objective:    Visit Vitals  /78 (BP 1 Location: Left arm, BP Patient Position: Sitting)   Pulse 71   Temp 98.7 °F (37.1 °C) (Oral)   Resp 16   Ht 6' 3\" (1.905 m)   Wt 225 lb (102.1 kg)   SpO2 97%   BMI 28.12 kg/m²       Umair Cross is a 59 y.o. male who appears well and in no distress. He is awake, alert, and oriented. A skin examination was performed including his scalp, face (including eyelids), ears, neck, chest, back, abdomen, upper extremities (including digits/nails), lower extremities, breasts; genital skin was not examined. There are lentigines on sun exposed areas.     There are pink inflammatory appearing macules and small pustules on the face consistent with mild rosacea.  He has scattered waxy macules and keratotic papules consistent with seborrheic keratoses. He has numerous nevi- larger on the trunk with many with two tones of medium and dark brown (dark centers) or pink and brown. Some are uniformly one color and smaller on the extremities. None that significantly stand out dermatoscopically or clinically to signify severe atypia. A dermatofibroma is noted on the left lower leg. He has red papules consistent with cherry angiomas. He has lentigines on the shoulders. There are pink/ yellow papules on the face consistent with sebaceous hyperplasia. Photos from today's visit:          Assessment/Plan:  1. History of atypical nevus. I discussed sun protection, sunscreen use, the warning signs of skin cancer, the need for self-skin examinations, and the need for regular practitioner exams every 1 year. The patient should follow up sooner as needed if new, changing, or symptomatic skin lesions arise.     2. Family history of skin cancer. I discussed sun protection, sunscreen use, the warning signs of skin cancer, the need for self-skin examinations, and the need for regular practitioner exams. The patient should follow up sooner as needed if new, changing, or symptomatic skin lesions arise.     3. Seborrheic keratoses. The diagnosis was reviewed and the patient was reassured that no treatment is needed for these benign lesions.     4. Cherry angiomas. The diagnosis was reviewed and the patient was reassured that no treatment is needed for these benign lesions.     5. Sebaceous Hyperplasia. The diagnosis was discussed as well as the benign nature of this condition. The patient was reassured that no treatment is needed at this time.     6. Normal nevi. The diagnosis of normal nevi was reviewed. I discussed sun protection, sunscreen use, the warning signs of skin cancer, mole monitoring, the need for self-skin examinations, and the need for regular practitioner exams.   The patient should follow up sooner as needed if new, changing, or symptomatic skin lesions arise. 7. Open comedone. The diagnosis was discussed. The patient was reassured that no treatment is necessary at this time. He can attempt to remove it in the shower if he would like. 8. Solar lentigos. The diagnosis and relationship to sun exposure was reviewed. Sun protection advised. 9. Dermatofibroma, left lower leg. The diagnosis was discussed. The patient was reassured that no treatment is necessary at this time. 10. Neoplasm of Uncertain Behavior, right nasal sidewall. The differential diagnoses were discussed. Does not have findings dermatoscopically for BCC. Will continue to monitor and compare photograph to ensure stability. Next skin exam:  9 months    This plan was reviewed with the patient and patient agrees. All questions were answered. This scribe documentation was reviewed by me and accurately reflects the examination and decisions made by me.

## 2019-10-28 NOTE — PROGRESS NOTES
Ayla Lin is a 59 y.o. male     Chief Complaint   Patient presents with    Skin Exam     full skin exam       Visit Vitals  /78 (BP 1 Location: Left arm, BP Patient Position: Sitting)   Pulse 71   Temp 98.7 °F (37.1 °C) (Oral)   Resp 16   Ht 6' 3\" (1.905 m)   Wt 225 lb (102.1 kg)   SpO2 97%   BMI 28.12 kg/m²       Health Maintenance Due   Topic Date Due    Hepatitis C Screening  1955    Shingrix Vaccine Age 50> (1 of 2) 03/31/2005    FOBT Q 1 YEAR AGE 50-75  03/31/2005    Pneumococcal 0-64 years (1 of 1 - PPSV23) 06/21/2010    DTaP/Tdap/Td series (2 - Tdap) 05/26/2018    Influenza Age 9 to Adult  08/01/2019       1. Have you been to the ER, urgent care clinic since your last visit? Hospitalized since your last visit? No    2. Have you seen or consulted any other health care providers outside of the [address] [place] [address] since your last visit? Include any pap smears or colon screening.  No

## 2019-12-16 DIAGNOSIS — F33.0 DEPRESSION, MAJOR, RECURRENT, MILD (HCC): ICD-10-CM

## 2019-12-16 RX ORDER — DULOXETIN HYDROCHLORIDE 30 MG/1
CAPSULE, DELAYED RELEASE ORAL
Qty: 30 CAP | Refills: 2 | Status: SHIPPED | OUTPATIENT
Start: 2019-12-16

## 2020-05-29 ENCOUNTER — HOSPITAL ENCOUNTER (OUTPATIENT)
Dept: RADIATION THERAPY | Age: 65
Discharge: HOME | End: 2020-05-29

## 2020-07-10 ENCOUNTER — HOSPITAL ENCOUNTER (OUTPATIENT)
Dept: RADIATION THERAPY | Age: 65
Discharge: HOME | End: 2020-07-10

## 2021-01-25 ENCOUNTER — HOSPITAL ENCOUNTER (OUTPATIENT)
Dept: RADIATION THERAPY | Age: 66
Discharge: HOME | End: 2021-01-25

## 2021-12-14 ENCOUNTER — TRANSCRIBE ORDER (OUTPATIENT)
Dept: SCHEDULING | Age: 66
End: 2021-12-14

## 2021-12-14 DIAGNOSIS — M25.531 RIGHT WRIST PAIN: Primary | ICD-10-CM

## 2021-12-14 DIAGNOSIS — M79.641 RIGHT HAND PAIN: ICD-10-CM

## 2021-12-14 DIAGNOSIS — M65.331 TRIGGER MIDDLE FINGER OF RIGHT HAND: ICD-10-CM

## 2022-02-25 ENCOUNTER — TRANSCRIBE ORDER (OUTPATIENT)
Dept: SCHEDULING | Age: 67
End: 2022-02-25

## 2022-02-25 DIAGNOSIS — M47.816 LUMBAR SPONDYLOSIS: ICD-10-CM

## 2022-02-25 DIAGNOSIS — M51.26 LUMBAR DISC HERNIATION: ICD-10-CM

## 2022-02-25 DIAGNOSIS — M54.16 LUMBAR RADICULOPATHY: Primary | ICD-10-CM

## 2022-03-02 ENCOUNTER — HOSPITAL ENCOUNTER (OUTPATIENT)
Dept: MRI IMAGING | Age: 67
Discharge: HOME | End: 2022-03-02
Attending: PHYSICAL MEDICINE & REHABILITATION
Payer: MEDICARE

## 2022-03-02 DIAGNOSIS — M51.26 LUMBAR DISC HERNIATION: ICD-10-CM

## 2022-03-02 DIAGNOSIS — M47.816 LUMBAR SPONDYLOSIS: ICD-10-CM

## 2022-03-02 DIAGNOSIS — M54.16 LUMBAR RADICULOPATHY: ICD-10-CM

## 2022-03-02 PROCEDURE — 74011250636 HC RX REV CODE- 250/636

## 2022-03-02 PROCEDURE — A9576 INJ PROHANCE MULTIPACK: HCPCS

## 2022-03-02 PROCEDURE — 72158 MRI LUMBAR SPINE W/O & W/DYE: CPT

## 2022-03-02 RX ADMIN — GADOTERIDOL 20 ML: 279.3 INJECTION, SOLUTION INTRAVENOUS at 19:07

## 2022-03-18 PROBLEM — G47.30 INSOMNIA WITH SLEEP APNEA: Status: ACTIVE | Noted: 2018-11-29

## 2022-03-18 PROBLEM — G47.00 INSOMNIA WITH SLEEP APNEA: Status: ACTIVE | Noted: 2018-11-29

## 2022-03-19 PROBLEM — F43.29 ADJUSTMENT DISORDER WITH MIXED EMOTIONAL FEATURES: Status: ACTIVE | Noted: 2019-08-07

## 2022-03-19 PROBLEM — F10.280 ALCOHOL DEPENDENCE WITH ALCOHOL-INDUCED ANXIETY DISORDER (HCC): Status: ACTIVE | Noted: 2018-11-29

## 2022-03-19 PROBLEM — F10.14 ALCOHOL ABUSE WITH ALCOHOL-INDUCED MOOD DISORDER (HCC): Status: ACTIVE | Noted: 2018-11-29

## 2022-03-20 PROBLEM — R63.4 WEIGHT LOSS, UNINTENTIONAL: Status: ACTIVE | Noted: 2019-09-19

## 2022-03-20 PROBLEM — F33.0 DEPRESSION, MAJOR, RECURRENT, MILD (HCC): Status: ACTIVE | Noted: 2019-09-19

## 2022-03-20 PROBLEM — R53.83 OTHER FATIGUE: Status: ACTIVE | Noted: 2019-09-19

## 2022-08-22 ENCOUNTER — OFFICE VISIT (OUTPATIENT)
Dept: URGENT CARE | Age: 67
End: 2022-08-22
Payer: MEDICARE

## 2022-08-22 VITALS
HEART RATE: 72 BPM | OXYGEN SATURATION: 98 % | WEIGHT: 258 LBS | BODY MASS INDEX: 32.25 KG/M2 | SYSTOLIC BLOOD PRESSURE: 165 MMHG | TEMPERATURE: 97.9 F | RESPIRATION RATE: 18 BRPM | DIASTOLIC BLOOD PRESSURE: 94 MMHG

## 2022-08-22 DIAGNOSIS — R29.898 WEAKNESS OF BOTH LOWER EXTREMITIES: ICD-10-CM

## 2022-08-22 DIAGNOSIS — M25.562 ACUTE PAIN OF LEFT KNEE: Primary | ICD-10-CM
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
## 2022-08-22 PROCEDURE — G8427 DOCREV CUR MEDS BY ELIG CLIN: HCPCS | Performed by: NURSE PRACTITIONER

## 2022-08-22 PROCEDURE — 3017F COLORECTAL CA SCREEN DOC REV: CPT | Performed by: NURSE PRACTITIONER

## 2022-08-22 PROCEDURE — 1123F ACP DISCUSS/DSCN MKR DOCD: CPT | Performed by: NURSE PRACTITIONER

## 2022-08-22 PROCEDURE — G9717 DOC PT DX DEP/BP F/U NT REQ: HCPCS | Performed by: NURSE PRACTITIONER

## 2022-08-22 PROCEDURE — G8536 NO DOC ELDER MAL SCRN: HCPCS | Performed by: NURSE PRACTITIONER

## 2022-08-22 PROCEDURE — 1101F PT FALLS ASSESS-DOCD LE1/YR: CPT | Performed by: NURSE PRACTITIONER

## 2022-08-22 PROCEDURE — G8417 CALC BMI ABV UP PARAM F/U: HCPCS | Performed by: NURSE PRACTITIONER

## 2022-08-22 PROCEDURE — 99203 OFFICE O/P NEW LOW 30 MIN: CPT | Performed by: NURSE PRACTITIONER

## 2022-08-22 PROCEDURE — G8753 SYS BP > OR = 140: HCPCS | Performed by: NURSE PRACTITIONER

## 2022-08-22 PROCEDURE — G8755 DIAS BP > OR = 90: HCPCS | Performed by: NURSE PRACTITIONER

## 2022-08-22 RX ORDER — DICLOFENAC SODIUM 25 MG/1
25 TABLET, DELAYED RELEASE ORAL
Qty: 21 TABLET | Refills: 0 | Status: SHIPPED | OUTPATIENT
Start: 2022-08-22

## 2022-08-23 NOTE — PROGRESS NOTES
Here for left knee pain  Onset 1 day ago       Past Medical History:   Diagnosis Date    Allergic rhinitis, cause unspecified     CAD (coronary artery disease)     DMI    Depression     Family history of skin cancer     mother    Hypercholesterolemia     Hypertension     EDISON (obstructive sleep apnea)     using CPAP    Sunburn, blistering         Past Surgical History:   Procedure Laterality Date    HX HEART CATHETERIZATION      HX LUMBAR LAMINECTOMY  89    HX ORTHOPAEDIC      lumbar hnp    HX ORTHOPAEDIC      carpel tunnel         Family History   Problem Relation Age of Onset    Hypertension Father     Diabetes Sister         Social History     Socioeconomic History    Marital status: SINGLE     Spouse name: Not on file    Number of children: Not on file    Years of education: Not on file    Highest education level: Not on file   Occupational History    Not on file   Tobacco Use    Smoking status: Former     Packs/day: 0.50     Years: 20.00     Pack years: 10.00     Types: Cigarettes     Quit date: 2002     Years since quittin.7    Smokeless tobacco: Never    Tobacco comments:        Substance and Sexual Activity    Alcohol use:  Yes     Alcohol/week: 12.5 standard drinks     Types: 15 Standard drinks or equivalent per week     Comment: everyday    Drug use: No    Sexual activity: Not on file   Other Topics Concern    Not on file   Social History Narrative    Not on file     Social Determinants of Health     Financial Resource Strain: Not on file   Food Insecurity: Not on file   Transportation Needs: Not on file   Physical Activity: Not on file   Stress: Not on file   Social Connections: Not on file   Intimate Partner Violence: Not on file   Housing Stability: Not on file                ALLERGIES: Latex and Levaquin [levofloxacin]    Review of Systems    Vitals:    22   BP: (!) 165/94   Pulse: 72   Resp: 18   Temp: 97.9 °F (36.6 °C)   SpO2: 98%   Weight: 258 lb (117 kg) Physical Exam    MDM    Procedures

## 2022-08-23 NOTE — PATIENT INSTRUCTIONS
Please call ortho 1st thing in morning. You need to follow up both for knee injury and for your back worsening/weakness- your spine specialist should be aware of this.

## 2022-08-27 NOTE — PROGRESS NOTES
Knee Pain  Patient complains of left knee pain. Onset of the symptoms was today. Inciting event: twisting injury associated with a fall. No head injury or LOC. Current symptoms include pain location: left knee medial and severity of pain = moderate to severe with exacerbation. Associated symptoms: knee giving out. Aggravating symptoms: walking. Patient's overall course: not improved. Reporting weakness in both lower extremities secondary to spinal stenosis. This is a chronic condition managed by spine specialist. Some recent worsening. Denies loss of control of bowel or bladder. Past Medical History:   Diagnosis Date    Allergic rhinitis, cause unspecified     CAD (coronary artery disease)     DMI    Depression     Family history of skin cancer     mother    Hypercholesterolemia     Hypertension     EDISON (obstructive sleep apnea)     using CPAP    Sunburn, blistering         Past Surgical History:   Procedure Laterality Date    HX HEART CATHETERIZATION      HX LUMBAR LAMINECTOMY  89    HX ORTHOPAEDIC      lumbar hnp    HX ORTHOPAEDIC      carpel tunnel         Family History   Problem Relation Age of Onset    Hypertension Father     Diabetes Sister         Social History     Socioeconomic History    Marital status: SINGLE     Spouse name: Not on file    Number of children: Not on file    Years of education: Not on file    Highest education level: Not on file   Occupational History    Not on file   Tobacco Use    Smoking status: Former     Packs/day: 0.50     Years: 20.00     Pack years: 10.00     Types: Cigarettes     Quit date: 2002     Years since quittin.7    Smokeless tobacco: Never    Tobacco comments:        Substance and Sexual Activity    Alcohol use:  Yes     Alcohol/week: 12.5 standard drinks     Types: 15 Standard drinks or equivalent per week     Comment: everyday    Drug use: No    Sexual activity: Not on file   Other Topics Concern    Not on file   Social History Narrative    Not on file     Social Determinants of Health     Financial Resource Strain: Not on file   Food Insecurity: Not on file   Transportation Needs: Not on file   Physical Activity: Not on file   Stress: Not on file   Social Connections: Not on file   Intimate Partner Violence: Not on file   Housing Stability: Not on file                ALLERGIES: Latex and Levaquin [levofloxacin]    Review of Systems   All other systems reviewed and are negative. Vitals:    08/22/22 1957   BP: (!) 165/94   Pulse: 72   Resp: 18   Temp: 97.9 °F (36.6 °C)   SpO2: 98%   Weight: 258 lb (117 kg)       Physical Exam  Vitals reviewed. Constitutional:       General: He is not in acute distress. Appearance: He is well-developed. HENT:      Head: Normocephalic and atraumatic. Cardiovascular:      Rate and Rhythm: Normal rate and regular rhythm. Heart sounds: Normal heart sounds. Pulmonary:      Effort: Pulmonary effort is normal.      Breath sounds: Normal breath sounds. Musculoskeletal:      Comments: Left knee medial pain with movement. TTP medial joint line. No bruising or abrasion. Full 5/5 strength flexion and extension. + martin test. Ambulating without assistance. Skin:     General: Skin is warm and dry. Neurological:      Mental Status: He is alert and oriented to person, place, and time. Psychiatric:         Behavior: Behavior normal.         Thought Content: Thought content normal.         Judgment: Judgment normal.       MDM     Differential Diagnosis; Clinical Impression; Plan:       CLINICAL IMPRESSION:  (M25.562) Acute pain of left knee  (primary encounter diagnosis)  (G24.ZWFP) Fall, initial encounter  (R29.898) Weakness of both lower extremities    Orders Placed This Encounter      diclofenac EC (VOLTAREN) 25 mg EC tablet          Sig: Take 1 Tablet by mouth three (3) times daily as needed for Pain.           Dispense:  21 Tablet          Refill:  0      Plan:  Left knee pain twisted during fall  Suspect possible meniscus injury  Low suspicion of fracture at this time- will hold off on XR currently  Diclofenac for pain per orders  Topical lidocaine gel for pain  To follow up with ortho ASAP within 5-7 days    LE weakness- reported worsening. Hx of spinal stenosis. Advised to contact his spine specialist for immediate follow up; for any worsening to go to ED.                   Procedures

## 2022-09-02 ENCOUNTER — TRANSCRIBE ORDER (OUTPATIENT)
Dept: SCHEDULING | Age: 67
End: 2022-09-02

## 2022-09-02 DIAGNOSIS — M48.061 LUMBAR STENOSIS: Primary | ICD-10-CM

## 2022-09-20 ENCOUNTER — HOSPITAL ENCOUNTER (OUTPATIENT)
Dept: MRI IMAGING | Age: 67
Discharge: HOME | End: 2022-09-20
Attending: NEUROLOGICAL SURGERY
Payer: MEDICARE

## 2022-09-20 DIAGNOSIS — M48.061 LUMBAR STENOSIS: ICD-10-CM

## 2022-09-20 PROCEDURE — 74011250636 HC RX REV CODE- 250/636: Performed by: NEUROLOGICAL SURGERY

## 2022-09-20 PROCEDURE — A9576 INJ PROHANCE MULTIPACK: HCPCS | Performed by: NEUROLOGICAL SURGERY

## 2022-09-20 PROCEDURE — 72158 MRI LUMBAR SPINE W/O & W/DYE: CPT

## 2022-09-20 RX ADMIN — GADOTERIDOL 20 ML: 279.3 INJECTION, SOLUTION INTRAVENOUS at 16:27

## 2022-10-06 ENCOUNTER — TRANSCRIBE ORDER (OUTPATIENT)
Dept: SCHEDULING | Age: 67
End: 2022-10-06

## 2022-10-06 DIAGNOSIS — S46.111A RUPTURE OF TENDON OF BICEPS, LONG HEAD, RIGHT, INITIAL ENCOUNTER: Primary | ICD-10-CM

## 2022-12-19 ENCOUNTER — OFFICE VISIT (OUTPATIENT)
Dept: CARDIOLOGY CLINIC | Age: 67
End: 2022-12-19
Payer: MEDICARE

## 2022-12-19 VITALS
SYSTOLIC BLOOD PRESSURE: 144 MMHG | OXYGEN SATURATION: 97 % | DIASTOLIC BLOOD PRESSURE: 80 MMHG | RESPIRATION RATE: 16 BRPM | HEIGHT: 75 IN | WEIGHT: 262 LBS | HEART RATE: 79 BPM | BODY MASS INDEX: 32.58 KG/M2

## 2022-12-19 DIAGNOSIS — I10 PRIMARY HYPERTENSION: ICD-10-CM

## 2022-12-19 DIAGNOSIS — R60.0 PEDAL EDEMA: ICD-10-CM

## 2022-12-19 DIAGNOSIS — G89.29 CHRONIC BACK PAIN, UNSPECIFIED BACK LOCATION, UNSPECIFIED BACK PAIN LATERALITY: ICD-10-CM

## 2022-12-19 DIAGNOSIS — R06.09 DOE (DYSPNEA ON EXERTION): ICD-10-CM

## 2022-12-19 DIAGNOSIS — M54.9 CHRONIC BACK PAIN, UNSPECIFIED BACK LOCATION, UNSPECIFIED BACK PAIN LATERALITY: ICD-10-CM

## 2022-12-19 DIAGNOSIS — I25.10 CORONARY ARTERY DISEASE INVOLVING NATIVE CORONARY ARTERY OF NATIVE HEART WITHOUT ANGINA PECTORIS: Primary | ICD-10-CM

## 2022-12-19 DIAGNOSIS — E78.2 MIXED HYPERLIPIDEMIA: ICD-10-CM

## 2022-12-19 PROCEDURE — 3078F DIAST BP <80 MM HG: CPT | Performed by: SPECIALIST

## 2022-12-19 PROCEDURE — G8754 DIAS BP LESS 90: HCPCS | Performed by: SPECIALIST

## 2022-12-19 PROCEDURE — 3017F COLORECTAL CA SCREEN DOC REV: CPT | Performed by: SPECIALIST

## 2022-12-19 PROCEDURE — 1101F PT FALLS ASSESS-DOCD LE1/YR: CPT | Performed by: SPECIALIST

## 2022-12-19 PROCEDURE — 3074F SYST BP LT 130 MM HG: CPT | Performed by: SPECIALIST

## 2022-12-19 PROCEDURE — G8753 SYS BP > OR = 140: HCPCS | Performed by: SPECIALIST

## 2022-12-19 PROCEDURE — G8427 DOCREV CUR MEDS BY ELIG CLIN: HCPCS | Performed by: SPECIALIST

## 2022-12-19 PROCEDURE — 1123F ACP DISCUSS/DSCN MKR DOCD: CPT | Performed by: SPECIALIST

## 2022-12-19 PROCEDURE — 93000 ELECTROCARDIOGRAM COMPLETE: CPT | Performed by: SPECIALIST

## 2022-12-19 PROCEDURE — 99204 OFFICE O/P NEW MOD 45 MIN: CPT | Performed by: SPECIALIST

## 2022-12-19 PROCEDURE — G8417 CALC BMI ABV UP PARAM F/U: HCPCS | Performed by: SPECIALIST

## 2022-12-19 PROCEDURE — G8536 NO DOC ELDER MAL SCRN: HCPCS | Performed by: SPECIALIST

## 2022-12-19 PROCEDURE — G9717 DOC PT DX DEP/BP F/U NT REQ: HCPCS | Performed by: SPECIALIST

## 2022-12-19 RX ORDER — PREGABALIN 150 MG/1
CAPSULE ORAL
COMMUNITY
Start: 2022-10-24

## 2022-12-19 RX ORDER — NALTREXONE HYDROCHLORIDE 50 MG/1
50 TABLET, FILM COATED ORAL DAILY
COMMUNITY
Start: 2022-05-26

## 2022-12-19 RX ORDER — LANOLIN ALCOHOL/MO/W.PET/CERES
CREAM (GRAM) TOPICAL DAILY
COMMUNITY

## 2022-12-19 RX ORDER — LOSARTAN POTASSIUM 50 MG/1
50 TABLET ORAL DAILY
COMMUNITY

## 2022-12-19 RX ORDER — FOLIC ACID 1 MG/1
TABLET ORAL DAILY
COMMUNITY

## 2022-12-19 RX ORDER — HYDROXYZINE 50 MG/1
50 TABLET, FILM COATED ORAL
COMMUNITY

## 2022-12-19 RX ORDER — BUSPIRONE HYDROCHLORIDE 15 MG/1
15 TABLET ORAL 2 TIMES DAILY
COMMUNITY

## 2022-12-19 RX ORDER — ACETAMINOPHEN 500 MG
4000 TABLET ORAL
COMMUNITY

## 2022-12-19 RX ORDER — TADALAFIL 5 MG/1
5 TABLET ORAL DAILY
COMMUNITY
Start: 2022-04-30

## 2022-12-19 RX ORDER — FERROUS SULFATE, DRIED 160(50) MG
1 TABLET, EXTENDED RELEASE ORAL DAILY
COMMUNITY

## 2022-12-19 RX ORDER — GUAIFENESIN 100 MG/5ML
81 LIQUID (ML) ORAL DAILY
COMMUNITY

## 2022-12-19 RX ORDER — SOLIFENACIN SUCCINATE 5 MG/1
5 TABLET, FILM COATED ORAL DAILY
COMMUNITY
Start: 2022-07-26

## 2022-12-19 RX ORDER — FINASTERIDE 5 MG/1
5 TABLET, FILM COATED ORAL DAILY
COMMUNITY

## 2022-12-19 NOTE — Clinical Note
12/19/2022    Patient: Travis Louie   YOB: 1955   Date of Visit: 12/19/2022     [address]  [place] [id number]  Via Fax: [phone]    Dear Carol Branch DO,      Thank you for referring Mr. Marcela Kaur to [facility] for evaluation. My notes for this consultation are attached. If you have questions, please do not hesitate to call me. I look forward to following your patient along with you.       Sincerely,    Michelle Valentin MD

## 2022-12-19 NOTE — PROGRESS NOTES
Nickie Lezama     1955       [place] [address] [address]  MD, [facility]  Date of Visit-12/19/2022   PCP is Unknown, Provider, MD   [facility]  [facility]  HPI:  Nickie Lezama is a 79 y.o. male   New patient, self referred   Pt is a previous pt of  and current pt of Dr Filbert Boast says Dr Rick Cevallos wanted him to see a cardiologist  Self referred for second opinion. Pt saw a cardiologist a month or two ago. No testing beyond EKG. CC-Pt notes an increase shortness of breath when walking up stairs which has been going on for about a year. Some edema, has mild , had a redness to legs for few days , six weeks ago, pictures he has look like a vasculitis  He says he [facility] [address] [facility] [place], with small vessel blocked and got a clot buster medication. Pt notes he hasn't been walking or being active as he has spinal stenosis. Has seen 4 docs, 3 suggested surgery either fusion or laminectomy, he then saw Dr Alma Mg and instead has noted foot drop on right with brace and PT as the plan  He sees Dr Hilton Conway for mild CKD. He is very forthwright about a history of alcohol abuse    Pt denies chest pain, syncope. Has no tachycardia, palpitations or sense of arrhythmia. Pt notes he has high BP. Cholesterol levels have been normal in the past. He takes BP meds, ASA and a high potency statin    EKG: abnormal, sinus . NOT diagnostic of an MI  Sinus  Rhythm  - Old inferior-apical infarct  -Left axis secondary to infarct -consider anterior fascicular block. Assessment/Plan:     1. Coronary artery disease involving native coronary artery of native heart without angina pectoris  -prior MI? Cath in [address], has SEAMAN and edema  Plan Lexiscan nuclear  Continue ASA, statin and BB    2. SEAMAN (dyspnea on exertion)  CC and most bothersome to him, not obviously fluid overloaded    Get echo and Lubertha Nebo, next labs a BNP would be helpful    3.  Pedal edema  Transient, none today, went over rash  Recent skin change of vasculitis, if recurs, see Derm for diagnostic biopsy  Echo    4. Mixed hyperlipidemia  At goal , denies excess muscle aches or new liver issues  On rosuvastatin also per pt  Epic shows LDL 47 on 9/30/22 at [facility], TG 69  HDL 55  Key Antihyperlipidemia Meds               ezetimibe (ZETIA) 10 mg tablet (Taking) TAKE 1 TABLET BY MOUTH EVERY DAY              5. Primary hypertension  160 , then 144/80, has chronic back pain, use home #s  At goal , meds and possible side effects reviewed and patient denies  Key CAD CHF Meds               losartan (COZAAR) 50 mg tablet (Taking) Take 50 mg by mouth daily. aspirin 81 mg chewable tablet (Taking) Take 81 mg by mouth daily. ezetimibe (ZETIA) 10 mg tablet (Taking) TAKE 1 TABLET BY MOUTH EVERY DAY    amLODIPine (NORVASC) 10 mg tablet (Taking) 5 mg. aspirin 81 mg Tab (Taking) Take  by mouth. BP Readings from Last 6 Encounters:   12/19/22 (!) 144/80   08/22/22 (!) 165/94   10/28/19 132/78   10/23/19 129/81   09/19/19 98/65   08/07/19 110/67          6. Chronic back pain, unspecified back location, unspecified back pain laterality  Complex issue    Patient Instructions   You have been scheduled for a lexiscan nuclear stress test and an echo. We will see you back after testing. Stress Test:    Please arrive 15 minutes prior to your appointment. Wear comfortable clothing and walking or athletic shoes. Do not eat or drink anything, except water, for at least 4 hours prior to your appointment. Avoid tobacco products for at least 12 hours prior to your test.    Do not eat or drink anything containing caffeine, including but not limited to the following: chocolate, regular and decaffeinated coffee, soft drinks, or tea for at least 24 hours prior to your test.    Do not hold your scheduled medications prior to your test.    Your test will be performed on a 2 day protocol.  This is determined by your height, weight, and other risk factors. For a 2 day test, please allow for 2 hours in the office each day. Impression:   1. Coronary artery disease involving native coronary artery of native heart without angina pectoris    2. SEAMAN (dyspnea on exertion)    3. Pedal edema    4. Mixed hyperlipidemia    5. Primary hypertension    6. Chronic back pain, unspecified back location, unspecified back pain laterality       Cardiac History:   No specialty comments available. Future Appointments   Date Time Provider Rossi Rodas   2023  8:30 AM JOSUE BLOUNT CAVREY BS AMB   2023 11:00 AM JOSUE JOSHI CAVREY BS AMB   2023  8:30 AM JOSUE BLOUNT CAVREY BS AMB   2023 10:40 [facility]   3/6/2023  9:15 AM Michelle Beard MD [facility] BS AMB      Patient Care Team:  Unknown, Provider, MD as PCP - General (Internal Medicine Physician)  Jazmine Noel MD as Physician (Dermatology Physician)  Ruby Rivera DO (Family Medicine)  Mary Vee MD (Cardiovascular Disease Physician)     Surgical history of prostate cancer 2020 with brachytherapy [facility], carpal tunnel surgery 2022 and elbow release at [facility] back operation previously prior cath  at [facility] with clot Buster  Allergy to latex  Quit smoking in  smoked for 15 years does use alcohol sometimes to the point of abuse. 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 Family history mother is 80 in fair health Father  of pneumonia at 80 3 siblings 3 of which is a sister who has diabetes.   See additional note  see supplement sheet, initialed and to be scanned by staff    Past Medical History:   Diagnosis Date    Allergic rhinitis, cause unspecified     CAD (coronary artery disease)     DMI    Depression     Family history of skin cancer     mother    Hypercholesterolemia Hypertension     EDISON (obstructive sleep apnea)     using CPAP    Sunburn, blistering       Social Hx= reports that he quit smoking about 20 years ago. His smoking use included cigarettes. He has a 10.00 pack-year smoking history. He has never used smokeless tobacco. He reports current alcohol use of about 12.5 standard drinks per week. He reports that he does not use drugs. Family Hx- family history includes Diabetes in his sister; Hypertension in his father. Allergies   Allergen Reactions    Latex Hives    Levaquin [Levofloxacin] Unknown (comments)     Pt states he is no allergic  10/2/18 LRG-LPN        Exam and Labs:  Visit Vitals  BP (!) 160/90   Pulse 79   Resp 16   Ht 6' 3\" (1.905 m)   Wt 262 lb (118.8 kg)   SpO2 97%   BMI 32.75 kg/m²    @Constitutional:  NAD, comfortable  Head: NC,AT. Eyes: No scleral icterus. Neck:  Neck supple. No JVD present. Throat: moist mucous membranes. Chest: Effort normal & normal respiratory excursion . Neurological: alert, conversant and oriented . Skin: Skin is not cold. No obvious systemic rash noted. Not diaphoretic. No erythema. Psychiatric:  Grossly normal mood and affect. Behavior appears normal. Extremities:  no clubbing or cyanosis. Abdomen: non distended    Lungs:breath sounds normal. No stridor. distress, wheezes or  Rales. Heart:    normal rate, regular rhythm, normal S1, S2, no murmurs, rubs, clicks or gallops , PMI non displaced. Edema: Edema is none.       Lab Results   Component Value Date/Time    Cholesterol, total 154 12/16/2010 09:35 AM    HDL Cholesterol 48 12/16/2010 09:35 AM    LDL, calculated 86 12/16/2010 09:35 AM    Triglyceride 98 12/16/2010 09:35 AM    CHOL/HDL Ratio 3.5 04/28/2010 08:24 AM     Lab Results   Component Value Date/Time    Sodium 138 09/20/2011 12:58 PM    Potassium 3.3 (L) 09/20/2011 12:58 PM    Chloride 98 09/20/2011 12:58 PM    CO2 25 09/20/2011 12:58 PM    Anion gap 11 04/28/2010 08:24 AM    Glucose 97 09/20/2011 12:58 PM    BUN 14 09/20/2011 12:58 PM    Creatinine 0.81 09/20/2011 12:58 PM    BUN/Creatinine ratio 17 09/20/2011 12:58 PM    GFR est  09/20/2011 12:58 PM    GFR est non-AA 99 09/20/2011 12:58 PM    Calcium 9.6 09/20/2011 12:58 PM      Wt Readings from Last 3 Encounters:   12/19/22 262 lb (118.8 kg)   08/22/22 258 lb (117 kg)   10/28/19 225 lb (102.1 kg)      BP Readings from Last 3 Encounters:   12/19/22 (!) 160/90   08/22/22 (!) 165/94   10/28/19 132/78      Current Outpatient Medications   Medication Sig    diclofenac EC (VOLTAREN) 25 mg EC tablet Take 1 Tablet by mouth three (3) times daily as needed for Pain. DULoxetine (CYMBALTA) 30 mg capsule TAKE 1 CAPSULE BY MOUTH EVERY DAY AT NIGHT    clonazePAM (KLONOPIN) 1 mg tablet Take 1.5 Tabs by mouth nightly. Max Daily Amount: 1.5 mg.    dextroamphetamine-amphetamine (ADDERALL) 20 mg tablet Take 1 Tab by mouth two (2) times a day. Max Daily Amount: 40 mg. (Patient not taking: Reported on 8/22/2022)    traZODone (DESYREL) 150 mg tablet Take 1 Tab by mouth nightly. lamoTRIgine (LAMICTAL) 200 mg tablet TAKE 1 TABLET BY MOUTH EVERY DAY    esomeprazole (NEXIUM) 40 mg capsule TAKE 1 (ONE) CAPSULE BY MOUTH DAILY 2 HOURS BEFORE DINNER    ezetimibe (ZETIA) 10 mg tablet TAKE 1 TABLET BY MOUTH EVERY DAY    escitalopram oxalate (LEXAPRO) 20 mg tablet TAKE 1 TABLET BY MOUTH EVERY DAY    tamsulosin (FLOMAX) 0.4 mg capsule Take 0.4 mg by mouth daily. (Patient not taking: Reported on 8/22/2022)    amLODIPine (NORVASC) 10 mg tablet 5 mg.    rosuvastatin (CRESTOR) 40 mg tablet Take 40 mg by mouth nightly. NON FORMULARY  (Patient not taking: Reported on 8/22/2022)    lisinopril-hydrochlorothiazide (PRINZIDE, ZESTORETIC) 20-12.5 mg per tablet TAKE 2 TABLETS EVERY DAY (Patient not taking: Reported on 8/22/2022)    atorvastatin (LIPITOR) 80 mg tablet Take 1 Tab by mouth daily.     fluticasone (FLONASE) 50 mcg/Actuation nasal spray USE 2 SPRAYS IN EACH NOSTRIL DAILY    cpap machine kit by Does Not Apply route. metoprolol-XL (TOPROL XL) 100 mg XL tablet Take 1 Tab by mouth daily. (Patient not taking: Reported on 8/22/2022)    naproxen sodium (NAPROSYN) 220 mg tablet Take 220 mg by mouth daily (with breakfast). Indications: MILD ARTHRITIC PAIN, back; 2 qam    montelukast (SINGULAIR) 10 mg tablet Take 10 mg by mouth daily. aspirin 81 mg Tab Take  by mouth. No current facility-administered medications for this visit. Impression see above.       Written by Ramy Matthews, as dictated by Dano Cedillo MD.

## 2022-12-19 NOTE — PROGRESS NOTES
Review of Systems   Constitutional:  Negative for diaphoresis, fever and malaise/fatigue. HENT:  Negative for ear pain, hearing loss, nosebleeds and tinnitus. Eyes:  Negative for blurred vision, double vision and pain. Respiratory:  Positive for shortness of breath. Negative for cough, hemoptysis, sputum production, wheezing and stridor. Cardiovascular:  Positive for leg swelling. Negative for chest pain, palpitations, orthopnea and claudication. Gastrointestinal:  Negative for abdominal pain, blood in stool, constipation, diarrhea, heartburn, nausea and vomiting. Genitourinary:  Positive for frequency. Negative for dysuria and urgency. Musculoskeletal:  Positive for back pain, falls and joint pain. Skin:  Positive for rash. Neurological:  Negative for dizziness, seizures, loss of consciousness, weakness and headaches. Endo/Heme/Allergies:  Bruises/bleeds easily. Psychiatric/Behavioral:  Positive for depression and memory loss. Negative for hallucinations. The patient is nervous/anxious and has insomnia.        Addendum to todays OV new patient note

## 2022-12-19 NOTE — PATIENT INSTRUCTIONS
You have been scheduled for a lexiscan nuclear stress test and an echo. We will see you back after testing. Stress Test:    Please arrive 15 minutes prior to your appointment. Wear comfortable clothing and walking or athletic shoes. Do not eat or drink anything, except water, for at least 4 hours prior to your appointment. Avoid tobacco products for at least 12 hours prior to your test.    Do not eat or drink anything containing caffeine, including but not limited to the following: chocolate, regular and decaffeinated coffee, soft drinks, or tea for at least 24 hours prior to your test.    Do not hold your scheduled medications prior to your test.    Your test will be performed on a 2 day protocol. This is determined by your height, weight, and other risk factors. For a 2 day test, please allow for 2 hours in the office each day.

## 2023-02-13 ENCOUNTER — ANCILLARY PROCEDURE (OUTPATIENT)
Dept: CARDIOLOGY CLINIC | Age: 68
End: 2023-02-13
Payer: MEDICARE

## 2023-02-13 VITALS
WEIGHT: 262 LBS | DIASTOLIC BLOOD PRESSURE: 78 MMHG | BODY MASS INDEX: 32.58 KG/M2 | SYSTOLIC BLOOD PRESSURE: 128 MMHG | HEIGHT: 75 IN

## 2023-02-13 DIAGNOSIS — I25.10 CORONARY ARTERY DISEASE INVOLVING NATIVE HEART, UNSPECIFIED VESSEL OR LESION TYPE, UNSPECIFIED WHETHER ANGINA PRESENT: ICD-10-CM

## 2023-02-13 DIAGNOSIS — R06.09 DOE (DYSPNEA ON EXERTION): ICD-10-CM

## 2023-02-13 DIAGNOSIS — R06.02 SOB (SHORTNESS OF BREATH): ICD-10-CM

## 2023-02-13 LAB
STRESS BASELINE DIAS BP: 78 MMHG
STRESS BASELINE HR: 63 BPM
STRESS BASELINE SYS BP: 128 MMHG
STRESS O2 SAT PEAK: 96 %
STRESS O2 SAT REST: 99 %
STRESS PEAK DIAS BP: 80 MMHG
STRESS PEAK SYS BP: 122 MMHG
STRESS PERCENT HR ACHIEVED: 50 %
STRESS POST PEAK HR: 77 BPM
STRESS RATE PRESSURE PRODUCT: 9394 BPM*MMHG
STRESS TARGET HR: 153 BPM

## 2023-02-13 PROCEDURE — A9500 TC99M SESTAMIBI: HCPCS | Performed by: SPECIALIST

## 2023-02-13 PROCEDURE — 93306 TTE W/DOPPLER COMPLETE: CPT | Performed by: SPECIALIST

## 2023-02-13 RX ORDER — TETRAKIS(2-METHOXYISOBUTYLISOCYANIDE)COPPER(I) TETRAFLUOROBORATE 1 MG/ML
40 INJECTION, POWDER, LYOPHILIZED, FOR SOLUTION INTRAVENOUS ONCE
Status: COMPLETED | OUTPATIENT
Start: 2023-02-13 | End: 2023-02-13

## 2023-02-13 RX ADMIN — REGADENOSON 0.4 MG: 0.08 INJECTION, SOLUTION INTRAVENOUS at 08:45

## 2023-02-13 RX ADMIN — TECHNETIUM TC 99M SESTAMIBI 26.4 MILLICURIE: 1 INJECTION, POWDER, FOR SOLUTION INTRAVENOUS at 08:45

## 2023-02-19 LAB
ECHO AO ASC DIAM: 4.2 CM
ECHO AO ASCENDING AORTA INDEX: 1.71 CM/M2
ECHO AO ROOT DIAM: 3.9 CM
ECHO AO ROOT INDEX: 1.59 CM/M2
ECHO AV AREA PEAK VELOCITY: 4.5 CM2
ECHO AV AREA VTI: 5.1 CM2
ECHO AV AREA/BSA PEAK VELOCITY: 1.8 CM2/M2
ECHO AV AREA/BSA VTI: 2.1 CM2/M2
ECHO AV MEAN GRADIENT: 4 MMHG
ECHO AV MEAN VELOCITY: 0.9 M/S
ECHO AV PEAK GRADIENT: 7 MMHG
ECHO AV PEAK VELOCITY: 1.3 M/S
ECHO AV VELOCITY RATIO: 0.92
ECHO AV VTI: 24.1 CM
ECHO LA DIAMETER INDEX: 1.79 CM/M2
ECHO LA DIAMETER: 4.4 CM
ECHO LA TO AORTIC ROOT RATIO: 1.13
ECHO LA VOL 2C: 84 ML (ref 18–58)
ECHO LA VOL 4C: 80 ML (ref 18–58)
ECHO LA VOL BP: 88 ML (ref 18–58)
ECHO LA VOL/BSA BIPLANE: 36 ML/M2 (ref 16–34)
ECHO LA VOLUME AREA LENGTH: 97 ML
ECHO LA VOLUME INDEX A2C: 34 ML/M2 (ref 16–34)
ECHO LA VOLUME INDEX A4C: 33 ML/M2 (ref 16–34)
ECHO LA VOLUME INDEX AREA LENGTH: 39 ML/M2 (ref 16–34)
ECHO LV E' LATERAL VELOCITY: 11 CM/S
ECHO LV E' SEPTAL VELOCITY: 7 CM/S
ECHO LV EDV A2C: 92 ML
ECHO LV EDV A4C: 108 ML
ECHO LV EDV BP: 99 ML (ref 67–155)
ECHO LV EDV INDEX A4C: 44 ML/M2
ECHO LV EDV INDEX BP: 40 ML/M2
ECHO LV EDV NDEX A2C: 37 ML/M2
ECHO LV EJECTION FRACTION A2C: 57 %
ECHO LV EJECTION FRACTION A4C: 53 %
ECHO LV EJECTION FRACTION BIPLANE: 55 % (ref 55–100)
ECHO LV ESV A2C: 39 ML
ECHO LV ESV A4C: 50 ML
ECHO LV ESV BP: 44 ML (ref 22–58)
ECHO LV ESV INDEX A2C: 16 ML/M2
ECHO LV ESV INDEX A4C: 20 ML/M2
ECHO LV ESV INDEX BP: 18 ML/M2
ECHO LV FRACTIONAL SHORTENING: 25 % (ref 28–44)
ECHO LV INTERNAL DIMENSION DIASTOLE INDEX: 2.24 CM/M2
ECHO LV INTERNAL DIMENSION DIASTOLIC: 5.5 CM (ref 4.2–5.9)
ECHO LV INTERNAL DIMENSION SYSTOLIC INDEX: 1.67 CM/M2
ECHO LV INTERNAL DIMENSION SYSTOLIC: 4.1 CM
ECHO LV IVSD: 1.5 CM (ref 0.6–1)
ECHO LV MASS 2D: 355.3 G (ref 88–224)
ECHO LV MASS INDEX 2D: 144.4 G/M2 (ref 49–115)
ECHO LV POSTERIOR WALL DIASTOLIC: 1.4 CM (ref 0.6–1)
ECHO LV RELATIVE WALL THICKNESS RATIO: 0.51
ECHO LVOT AREA: 4.9 CM2
ECHO LVOT AV VTI INDEX: 1.01
ECHO LVOT DIAM: 2.5 CM
ECHO LVOT MEAN GRADIENT: 3 MMHG
ECHO LVOT PEAK GRADIENT: 5 MMHG
ECHO LVOT PEAK VELOCITY: 1.2 M/S
ECHO LVOT STROKE VOLUME INDEX: 48.7 ML/M2
ECHO LVOT SV: 119.7 ML
ECHO LVOT VTI: 24.4 CM
ECHO MV A VELOCITY: 1.03 M/S
ECHO MV E DECELERATION TIME (DT): 184 MS
ECHO MV E VELOCITY: 0.79 M/S
ECHO MV E/A RATIO: 0.77
ECHO MV E/E' LATERAL: 7.18
ECHO MV E/E' RATIO (AVERAGED): 9.23
ECHO MV E/E' SEPTAL: 11.29
ECHO PV MAX VELOCITY: 1.1 M/S
ECHO PV PEAK GRADIENT: 5 MMHG
ECHO RV TAPSE: 2.4 CM (ref 1.7–?)
ECHO TV REGURGITANT MAX VELOCITY: 2.36 M/S
ECHO TV REGURGITANT PEAK GRADIENT: 22 MMHG

## 2023-02-22 ENCOUNTER — APPOINTMENT (OUTPATIENT)
Dept: CARDIOLOGY CLINIC | Age: 68
End: 2023-02-22

## 2023-02-22 ENCOUNTER — OFFICE VISIT (OUTPATIENT)
Dept: CARDIOLOGY CLINIC | Age: 68
End: 2023-02-22
Payer: MEDICARE

## 2023-02-22 VITALS
DIASTOLIC BLOOD PRESSURE: 70 MMHG | BODY MASS INDEX: 31.08 KG/M2 | WEIGHT: 250 LBS | HEIGHT: 75 IN | RESPIRATION RATE: 16 BRPM | SYSTOLIC BLOOD PRESSURE: 110 MMHG | HEART RATE: 68 BPM | OXYGEN SATURATION: 96 %

## 2023-02-22 DIAGNOSIS — M54.9 CHRONIC BACK PAIN, UNSPECIFIED BACK LOCATION, UNSPECIFIED BACK PAIN LATERALITY: ICD-10-CM

## 2023-02-22 DIAGNOSIS — E78.2 MIXED HYPERLIPIDEMIA: ICD-10-CM

## 2023-02-22 DIAGNOSIS — R60.0 PEDAL EDEMA: ICD-10-CM

## 2023-02-22 DIAGNOSIS — F10.14 ALCOHOL ABUSE WITH ALCOHOL-INDUCED MOOD DISORDER (HCC): ICD-10-CM

## 2023-02-22 DIAGNOSIS — G89.29 CHRONIC BACK PAIN, UNSPECIFIED BACK LOCATION, UNSPECIFIED BACK PAIN LATERALITY: ICD-10-CM

## 2023-02-22 DIAGNOSIS — I25.10 CORONARY ARTERY DISEASE INVOLVING NATIVE CORONARY ARTERY OF NATIVE HEART WITHOUT ANGINA PECTORIS: Primary | ICD-10-CM

## 2023-02-22 DIAGNOSIS — R06.09 DOE (DYSPNEA ON EXERTION): ICD-10-CM

## 2023-02-22 DIAGNOSIS — I10 PRIMARY HYPERTENSION: ICD-10-CM

## 2023-02-22 PROCEDURE — 3074F SYST BP LT 130 MM HG: CPT | Performed by: SPECIALIST

## 2023-02-22 PROCEDURE — 3078F DIAST BP <80 MM HG: CPT | Performed by: SPECIALIST

## 2023-02-22 PROCEDURE — 99214 OFFICE O/P EST MOD 30 MIN: CPT | Performed by: SPECIALIST

## 2023-02-22 PROCEDURE — G8427 DOCREV CUR MEDS BY ELIG CLIN: HCPCS | Performed by: SPECIALIST

## 2023-02-22 PROCEDURE — G9717 DOC PT DX DEP/BP F/U NT REQ: HCPCS | Performed by: SPECIALIST

## 2023-02-22 PROCEDURE — 1101F PT FALLS ASSESS-DOCD LE1/YR: CPT | Performed by: SPECIALIST

## 2023-02-22 PROCEDURE — 1123F ACP DISCUSS/DSCN MKR DOCD: CPT | Performed by: SPECIALIST

## 2023-02-22 PROCEDURE — G8417 CALC BMI ABV UP PARAM F/U: HCPCS | Performed by: SPECIALIST

## 2023-02-22 PROCEDURE — G8536 NO DOC ELDER MAL SCRN: HCPCS | Performed by: SPECIALIST

## 2023-02-22 PROCEDURE — G0463 HOSPITAL OUTPT CLINIC VISIT: HCPCS | Performed by: SPECIALIST

## 2023-02-22 PROCEDURE — 3017F COLORECTAL CA SCREEN DOC REV: CPT | Performed by: SPECIALIST

## 2023-02-22 RX ORDER — ROSUVASTATIN CALCIUM 40 MG/1
40 TABLET, COATED ORAL DAILY
COMMUNITY
Start: 2023-02-08

## 2023-02-22 NOTE — PROGRESS NOTES
Paola Hoover     1955       Enid Patel MD, [facility]  Date of Visit-02/22/2023   PCP is Willye Kanner, MD   [facility] [place]  [facility]  HPI:  Paola Hoover is a 79 y.o. male   Today Diane Kong returns and doing well he is actually feeling better he is going to [place] [address] and is now not been drinking for 3 to 6 weeks. He is now seeing Dr. Bro Campos is his primary care physician. He is here to review his echo and have a second part of his nuclear which is done today. He has no chest pain. He has a 14 step stairs at home and if he carries something of the stairs he will feel little winded but overall feels well. He is in no edema tachycardia or palpitations. Nuclear today shows a fixed mid and distal lateral wall defect. EF is 56%. Recent echo showed normal LV function of 55% with mild to moderate LVH.  02/13/23    ECHO ADULT COMPLETE 02/19/2023 2/19/2023    Interpretation Summary  Normal left ventricular systolic function with an ejection fraction of 55% by biplane and visually. LV size and motion are normal.  Mild to moderate concentric left ventricular hypertrophy with diastolic dysfunction. Mild mitral insufficiency. Mild left atrial enlargement. Mildly dilated ascending aorta at 4.2 cm. Signed by: Laya Lamas MD on 2/19/2023  1:33 PM    Hx--  He says he had a cath in [facility] in [address] [facility], with small vessel blocked and got a clot buster medication. Pt notes he hasn't been walking or being active as he has spinal stenosis. Has seen 4 docs, 3 suggested surgery either fusion or laminectomy, he then saw Dr Lubna Figueroa and instead has noted foot drop on right with brace and PT as the plan  He sees Dr Clifford Gray for mild CKD. He is very forthwright about a history of alcohol abuse    Assessment/Plan:     Patient Instructions   We will see you back for an annual follow up.      Impression   he has no edema has minimal SEAMAN   blood pressure is 40 points better   his stress test shows a fixed area without reversible ischemia. His EF is preserved by echo. Overall think he is doing well continue on his current medications   congratulated him on working with Tee Eaton and that is going to be continued work in progress for him. We will see him back yearly. 1. Coronary artery disease involving native coronary artery of native heart without angina pectoris  -prior MI, got tpa? Cath around 2000, SEAMAN and edema better off Etoh  Fixed lateral defect consistent with moderate scar with preserved EF  Continue ASA, statin and BB    2. SEAMAN (dyspnea on exertion)  Improved EF normal on echo and nuclear  Has no ischemia    3. Pedal edema  Transient, none today,   Previous skin change of vasculitis, if recurs, see Derm for diagnostic biopsy  Echo    4. Mixed hyperlipidemia  At goal , denies excess muscle aches or new liver issues  On rosuvastatin also per pt  Epic shows LDL 47 on 9/30/22 at Renal, TG 69  HDL 55  Key Antihyperlipidemia Meds               rosuvastatin (CRESTOR) 40 mg tablet (Taking) Take 40 mg by mouth daily. ezetimibe (ZETIA) 10 mg tablet (Taking) TAKE 1 TABLET BY MOUTH EVERY DAY              5. Primary hypertension  At goal , meds and possible side effects reviewed and patient denies  Key CAD CHF Meds               rosuvastatin (CRESTOR) 40 mg tablet (Taking) Take 40 mg by mouth daily. losartan (COZAAR) 50 mg tablet (Taking) Take 50 mg by mouth daily. aspirin 81 mg chewable tablet (Taking) Take 81 mg by mouth daily. ezetimibe (ZETIA) 10 mg tablet (Taking) TAKE 1 TABLET BY MOUTH EVERY DAY    amLODIPine (NORVASC) 10 mg tablet (Taking) 5 mg. aspirin 81 mg Tab Take  by mouth. BP Readings from Last 6 Encounters:   02/22/23 110/70   02/13/23 128/78   12/19/22 (!) 144/80   08/22/22 (!) 165/94   10/28/19 132/78   10/23/19 129/81          6. Chronic back pain, unspecified back location, unspecified back pain laterality  Complex issue  7. Alcohol abuse with mood disorder  BP down 40 points, going to AA, sober for 6 weeks     Impression:   1. Coronary artery disease involving native coronary artery of native heart without angina pectoris    2. SEAMAN (dyspnea on exertion)    3. Pedal edema    4. Mixed hyperlipidemia    5. Primary hypertension    6. Chronic back pain, unspecified back location, unspecified back pain laterality    7. Alcohol abuse with alcohol-induced mood disorder [facility])         Cardiac History:   Epic shows LDL 47 on 22 at Renal, TG 69  HDL 55     Future Appointments   Date Time Provider Rossi Rodas   3/9/2023  9:20 AM MD ADAM Patterson BS AMB   2024  9:20 AM MD ZBIGNIEW Castro BS AMB      Patient Care Team:  Willye Kanner, MD as PCP - General (Internal Medicine Physician)  Caroline Koroma MD as Physician (Dermatology Physician)  Maura Casey DO (Family Medicine)  Nancy Valdovinos MD (Cardiovascular Disease Physician)     Surgical history of prostate cancer 2020 with brachytherapy [facility], carpal tunnel surgery 2022 and elbow release at [address] [place] [address] back operation previously prior cath  at [facility] with clot Buster  Allergy to latex  Quit smoking in  smoked for 15 years does use alcohol sometimes to the point of abuse. 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 Family history mother is 80 in fair health Father  of pneumonia at 80 3 siblings 3 of which is a sister who has diabetes.   See additional note  see supplement sheet, initialed and to be scanned by staff    Past Medical History:   Diagnosis Date    Allergic rhinitis, cause unspecified     CAD (coronary artery disease)     DMI    Depression     Family history of skin cancer     mother    Hypercholesterolemia     Hypertension     EDISON (obstructive sleep apnea)     using CPAP    Sunburn, blistering Social Hx= reports that he quit smoking about 20 years ago. His smoking use included cigarettes. He has a 10.00 pack-year smoking history. He has never used smokeless tobacco. He reports current alcohol use of about 12.5 standard drinks per week. He reports that he does not use drugs. Family Hx- family history includes Diabetes in his sister; Hypertension in his father. Allergies   Allergen Reactions    Latex Hives    Levaquin [Levofloxacin] Unknown (comments)     Pt states he is no allergic  10/2/18 LRG-LPN        Exam and Labs:  Visit Vitals  /70   Pulse 68   Resp 16   Ht 6' 3\" (1.905 m)   Wt 250 lb (113.4 kg)   SpO2 96%   BMI 31.25 kg/m²    @Constitutional:  NAD, comfortable  Head: NC,AT. Eyes: No scleral icterus. Neck:  Neck supple. No JVD present. Throat: moist mucous membranes. Chest: Effort normal & normal respiratory excursion . Neurological: alert, conversant and oriented . Skin: Skin is not cold. No obvious systemic rash noted. Not diaphoretic. No erythema. Psychiatric:  Grossly normal mood and affect. Behavior appears normal. Extremities:  no clubbing or cyanosis. Abdomen: non distended    Lungs:breath sounds normal. No stridor. distress, wheezes or  Rales. Heart:    normal rate, regular rhythm, normal S1, S2, no murmurs, rubs, clicks or gallops , PMI non displaced. Edema: Edema is none.       Lab Results   Component Value Date/Time    Cholesterol, total 154 12/16/2010 09:35 AM    HDL Cholesterol 48 12/16/2010 09:35 AM    LDL, calculated 86 12/16/2010 09:35 AM    Triglyceride 98 12/16/2010 09:35 AM    CHOL/HDL Ratio 3.5 04/28/2010 08:24 AM     Lab Results   Component Value Date/Time    Sodium 138 09/20/2011 12:58 PM    Potassium 3.3 (L) 09/20/2011 12:58 PM    Chloride 98 09/20/2011 12:58 PM    CO2 25 09/20/2011 12:58 PM    Anion gap 11 04/28/2010 08:24 AM    Glucose 97 09/20/2011 12:58 PM    BUN 14 09/20/2011 12:58 PM    Creatinine 0.81 09/20/2011 12:58 PM    BUN/Creatinine ratio 17 09/20/2011 12:58 PM    GFR est  09/20/2011 12:58 PM    GFR est non-AA 99 09/20/2011 12:58 PM    Calcium 9.6 09/20/2011 12:58 PM      Wt Readings from Last 3 Encounters:   02/22/23 250 lb (113.4 kg)   02/13/23 262 lb (118.8 kg)   12/19/22 262 lb (118.8 kg)      BP Readings from Last 3 Encounters:   02/22/23 110/70   02/13/23 128/78   12/19/22 (!) 144/80      Current Outpatient Medications   Medication Sig    rosuvastatin (CRESTOR) 40 mg tablet Take 40 mg by mouth daily. busPIRone (BUSPAR) 15 mg tablet Take 15 mg by mouth two (2) times a day. calcium-vitamin D (OS-ROBERT +D3) 500 mg-200 unit per tablet Take 1 Tablet by mouth daily. cholecalciferol (VITAMIN D3) (2,000 UNITS /50 MCG) cap capsule Take 4,000 Int'l Units/day by mouth. finasteride (PROSCAR) 5 mg tablet Take 5 mg by mouth daily. hydrOXYzine HCL (ATARAX) 50 mg tablet Take 50 mg by mouth three (3) times daily as needed. losartan (COZAAR) 50 mg tablet Take 50 mg by mouth daily. naltrexone (DEPADE) 50 mg tablet Take 50 mg by mouth daily. pregabalin (LYRICA) 150 mg capsule TAKE ONE CAPSULE BY MOUTH TWICE A DAY IN THE MORNING AND IN THE EVENING    solifenacin (VESICARE) 5 mg tablet 5 mg daily. tadalafiL (CIALIS) 5 mg tablet Take 5 mg by mouth daily. aspirin 81 mg chewable tablet Take 81 mg by mouth daily. thiamine HCL (B-1) 100 mg tablet Take  by mouth daily. folic acid (FOLVITE) 1 mg tablet Take  by mouth daily. diclofenac EC (VOLTAREN) 25 mg EC tablet Take 1 Tablet by mouth three (3) times daily as needed for Pain. DULoxetine (CYMBALTA) 30 mg capsule TAKE 1 CAPSULE BY MOUTH EVERY DAY AT NIGHT    clonazePAM (KLONOPIN) 1 mg tablet Take 1.5 Tabs by mouth nightly. Max Daily Amount: 1.5 mg.    traZODone (DESYREL) 150 mg tablet Take 1 Tab by mouth nightly.     lamoTRIgine (LAMICTAL) 200 mg tablet TAKE 1 TABLET BY MOUTH EVERY DAY    ezetimibe (ZETIA) 10 mg tablet TAKE 1 TABLET BY MOUTH EVERY DAY    tamsulosin (FLOMAX) 0.4 mg capsule Take 0.4 mg by mouth daily. amLODIPine (NORVASC) 10 mg tablet 5 mg. fluticasone (FLONASE) 50 mcg/Actuation nasal spray USE 2 SPRAYS IN EACH NOSTRIL DAILY    cpap machine kit by Does Not Apply route. aspirin 81 mg Tab Take  by mouth. No current facility-administered medications for this visit. Impression see above. This note was created using voice recognition software. Despite editing, there may be syntax errors.

## 2023-02-22 NOTE — LETTER
2/22/2023    Patient: Nam Longoria   YOB: 1955   Date of Visit: 2/22/2023     Manuel King MD  [address] [address]  Via Fax: [phone]    Dear Manuel King MD,      Thank you for referring Mr. Jodie Boudreaux to [address] [place] [address] for evaluation. My notes for this consultation are attached. If you have questions, please do not hesitate to call me. I look forward to following your patient along with you.       Sincerely,    Horace Akers MD

## 2023-03-16 ENCOUNTER — ANESTHESIA EVENT (OUTPATIENT)
Dept: SURGERY | Age: 68
End: 2023-03-16
Payer: MEDICARE

## 2023-03-17 ENCOUNTER — HOSPITAL ENCOUNTER (OUTPATIENT)
Age: 68
Setting detail: OUTPATIENT SURGERY
Discharge: HOME | End: 2023-03-17
Attending: ORTHOPAEDIC SURGERY | Admitting: ORTHOPAEDIC SURGERY
Payer: MEDICARE

## 2023-03-17 ENCOUNTER — ANESTHESIA (OUTPATIENT)
Dept: SURGERY | Age: 68
End: 2023-03-17
Payer: MEDICARE

## 2023-03-17 VITALS
HEIGHT: 75 IN | DIASTOLIC BLOOD PRESSURE: 82 MMHG | RESPIRATION RATE: 16 BRPM | TEMPERATURE: 97.9 F | SYSTOLIC BLOOD PRESSURE: 128 MMHG | BODY MASS INDEX: 32.7 KG/M2 | WEIGHT: 263.01 LBS | OXYGEN SATURATION: 94 % | HEART RATE: 68 BPM

## 2023-03-17 DIAGNOSIS — G89.18 POST-OPERATIVE PAIN: Primary | ICD-10-CM

## 2023-03-17 PROCEDURE — 74011250636 HC RX REV CODE- 250/636: Performed by: ORTHOPAEDIC SURGERY

## 2023-03-17 PROCEDURE — 77030040922 HC BLNKT HYPOTHRM STRY -A

## 2023-03-17 PROCEDURE — 77030002986 HC SUT PROL J&J -A: Performed by: ORTHOPAEDIC SURGERY

## 2023-03-17 PROCEDURE — 76030000000 HC AMB SURG OR TIME 0.5 TO 1: Performed by: ORTHOPAEDIC SURGERY

## 2023-03-17 PROCEDURE — 76210000050 HC AMBSU PH II REC 0.5 TO 1 HR: Performed by: ORTHOPAEDIC SURGERY

## 2023-03-17 PROCEDURE — 2709999900 HC NON-CHARGEABLE SUPPLY: Performed by: ORTHOPAEDIC SURGERY

## 2023-03-17 PROCEDURE — 74011250636 HC RX REV CODE- 250/636: Performed by: ANESTHESIOLOGY

## 2023-03-17 PROCEDURE — 77030006689 HC BLD OPHTH BVR BD -A: Performed by: ORTHOPAEDIC SURGERY

## 2023-03-17 PROCEDURE — 74011250636 HC RX REV CODE- 250/636: Performed by: NURSE ANESTHETIST, CERTIFIED REGISTERED

## 2023-03-17 PROCEDURE — 76060000061 HC AMB SURG ANES 0.5 TO 1 HR: Performed by: ORTHOPAEDIC SURGERY

## 2023-03-17 PROCEDURE — 77030000032 HC CUF TRNQT ZIMM -B: Performed by: ORTHOPAEDIC SURGERY

## 2023-03-17 PROCEDURE — 74011000250 HC RX REV CODE- 250: Performed by: ORTHOPAEDIC SURGERY

## 2023-03-17 PROCEDURE — 77030040361 HC SLV COMPR DVT MDII -B

## 2023-03-17 RX ORDER — SODIUM CHLORIDE, SODIUM LACTATE, POTASSIUM CHLORIDE, CALCIUM CHLORIDE 600; 310; 30; 20 MG/100ML; MG/100ML; MG/100ML; MG/100ML
125 INJECTION, SOLUTION INTRAVENOUS CONTINUOUS
Status: DISCONTINUED | OUTPATIENT
Start: 2023-03-17 | End: 2023-03-17 | Stop reason: HOSPADM

## 2023-03-17 RX ORDER — HYDROMORPHONE HYDROCHLORIDE 1 MG/ML
.25-1 INJECTION, SOLUTION INTRAMUSCULAR; INTRAVENOUS; SUBCUTANEOUS
Status: DISCONTINUED | OUTPATIENT
Start: 2023-03-17 | End: 2023-03-17 | Stop reason: HOSPADM

## 2023-03-17 RX ORDER — FLUMAZENIL 0.1 MG/ML
0.2 INJECTION INTRAVENOUS
Status: DISCONTINUED | OUTPATIENT
Start: 2023-03-17 | End: 2023-03-17 | Stop reason: HOSPADM

## 2023-03-17 RX ORDER — DIPHENHYDRAMINE HYDROCHLORIDE 50 MG/ML
12.5 INJECTION, SOLUTION INTRAMUSCULAR; INTRAVENOUS AS NEEDED
Status: DISCONTINUED | OUTPATIENT
Start: 2023-03-17 | End: 2023-03-17 | Stop reason: HOSPADM

## 2023-03-17 RX ORDER — KETOROLAC TROMETHAMINE 30 MG/ML
INJECTION, SOLUTION INTRAMUSCULAR; INTRAVENOUS AS NEEDED
Status: DISCONTINUED | OUTPATIENT
Start: 2023-03-17 | End: 2023-03-17 | Stop reason: HOSPADM

## 2023-03-17 RX ORDER — PROPOFOL 10 MG/ML
INJECTION, EMULSION INTRAVENOUS AS NEEDED
Status: DISCONTINUED | OUTPATIENT
Start: 2023-03-17 | End: 2023-03-17 | Stop reason: HOSPADM

## 2023-03-17 RX ORDER — HYDROCODONE BITARTRATE AND ACETAMINOPHEN 5; 325 MG/1; MG/1
1 TABLET ORAL
Qty: 8 TABLET | Refills: 0 | Status: SHIPPED | OUTPATIENT
Start: 2023-03-17 | End: 2023-03-20

## 2023-03-17 RX ORDER — FENTANYL CITRATE 50 UG/ML
INJECTION, SOLUTION INTRAMUSCULAR; INTRAVENOUS AS NEEDED
Status: DISCONTINUED | OUTPATIENT
Start: 2023-03-17 | End: 2023-03-17 | Stop reason: HOSPADM

## 2023-03-17 RX ORDER — LIDOCAINE HYDROCHLORIDE 10 MG/ML
0.1 INJECTION, SOLUTION EPIDURAL; INFILTRATION; INTRACAUDAL; PERINEURAL AS NEEDED
Status: DISCONTINUED | OUTPATIENT
Start: 2023-03-17 | End: 2023-03-17 | Stop reason: HOSPADM

## 2023-03-17 RX ORDER — MIDAZOLAM HYDROCHLORIDE 1 MG/ML
INJECTION, SOLUTION INTRAMUSCULAR; INTRAVENOUS AS NEEDED
Status: DISCONTINUED | OUTPATIENT
Start: 2023-03-17 | End: 2023-03-17 | Stop reason: HOSPADM

## 2023-03-17 RX ORDER — NALOXONE HYDROCHLORIDE 0.4 MG/ML
0.2 INJECTION, SOLUTION INTRAMUSCULAR; INTRAVENOUS; SUBCUTANEOUS
Status: DISCONTINUED | OUTPATIENT
Start: 2023-03-17 | End: 2023-03-17 | Stop reason: HOSPADM

## 2023-03-17 RX ADMIN — PROPOFOL 30 MG: 10 INJECTION, EMULSION INTRAVENOUS at 10:34

## 2023-03-17 RX ADMIN — PROPOFOL 20 MG: 10 INJECTION, EMULSION INTRAVENOUS at 10:26

## 2023-03-17 RX ADMIN — SODIUM CHLORIDE, POTASSIUM CHLORIDE, SODIUM LACTATE AND CALCIUM CHLORIDE: 600; 310; 30; 20 INJECTION, SOLUTION INTRAVENOUS at 10:00

## 2023-03-17 RX ADMIN — FENTANYL CITRATE 25 MCG: 50 INJECTION, SOLUTION INTRAMUSCULAR; INTRAVENOUS at 10:45

## 2023-03-17 RX ADMIN — MIDAZOLAM HYDROCHLORIDE 2 MG: 1 INJECTION, SOLUTION INTRAMUSCULAR; INTRAVENOUS at 10:13

## 2023-03-17 RX ADMIN — KETOROLAC TROMETHAMINE 30 MG: 30 INJECTION, SOLUTION INTRAMUSCULAR; INTRAVENOUS at 10:45

## 2023-03-17 RX ADMIN — PROPOFOL 50 MG: 10 INJECTION, EMULSION INTRAVENOUS at 10:15

## 2023-03-17 RX ADMIN — FENTANYL CITRATE 25 MCG: 50 INJECTION, SOLUTION INTRAMUSCULAR; INTRAVENOUS at 10:31

## 2023-03-17 RX ADMIN — MIDAZOLAM HYDROCHLORIDE 3 MG: 1 INJECTION, SOLUTION INTRAMUSCULAR; INTRAVENOUS at 10:07

## 2023-03-17 RX ADMIN — CEFAZOLIN SODIUM 2 G: 1 POWDER, FOR SOLUTION INTRAMUSCULAR; INTRAVENOUS at 10:15

## 2023-03-17 RX ADMIN — FENTANYL CITRATE 50 MCG: 50 INJECTION, SOLUTION INTRAMUSCULAR; INTRAVENOUS at 10:13

## 2023-03-17 NOTE — ANESTHESIA PREPROCEDURE EVALUATION
Relevant Problems   No relevant active problems       Anesthetic History   No history of anesthetic complications            Review of Systems / Medical History  Patient summary reviewed    Pulmonary        Sleep apnea: CPAP           Neuro/Psych         Psychiatric history     Cardiovascular    Hypertension: well controlled          CAD (reports negative recent stress test)    Exercise tolerance: >4 METS     GI/Hepatic/Renal             Pertinent negatives: No hiatal hernia   Endo/Other  Within defined limits           Other Findings              Physical Exam    Airway  Mallampati: III  TM Distance: 4 - 6 cm         Cardiovascular    Rhythm: regular  Rate: normal         Dental  No notable dental hx       Pulmonary  Breath sounds clear to auscultation               Abdominal  Abdominal exam normal       Other Findings            Anesthetic Plan    ASA: 3  Anesthesia type: MAC          Induction: Intravenous  Anesthetic plan and risks discussed with: Patient

## 2023-03-17 NOTE — ANESTHESIA POSTPROCEDURE EVALUATION
Procedure(s):  RIGHT MIDDLE FINGER TRIGGER RELEASE (MAC/LOCAL).     MAC    Anesthesia Post Evaluation      Multimodal analgesia: multimodal analgesia used between 6 hours prior to anesthesia start to PACU discharge  Patient location during evaluation: PACU  Patient participation: complete - patient participated  Level of consciousness: sleepy but conscious and awake and alert  Pain management: adequate  Airway patency: patent  Anesthetic complications: no  Cardiovascular status: acceptable  Respiratory status: acceptable  Hydration status: acceptable  Post anesthesia nausea and vomiting:  none  Final Post Anesthesia Temperature Assessment:  Normothermia (36.0-37.5 degrees C)      INITIAL Post-op Vital signs:   Vitals Value Taken Time   /82 03/17/23 1137   Temp 36.6 °C (97.9 °F) 03/17/23 1137   Pulse 68 03/17/23 1137   Resp 16 03/17/23 1137   SpO2 94 % 03/17/23 1137

## 2023-03-17 NOTE — BRIEF OP NOTE
Brief Postoperative Note    Patient: Nam Longoria  YOB: 1955  MRN: [id number]    Date of Procedure: 3/17/2023     Pre-Op Diagnosis: RIGHT MIDDLE TRIGGER FINGER    Post-Op Diagnosis: Same as preoperative diagnosis.       Procedure(s):  RIGHT MIDDLE FINGER TRIGGER RELEASE (MAC/LOCAL)    Surgeon(s):  Snady Santos MD    Surgical Assistant: Surg Asst-1: Jefferson Ventura    Anesthesia: MAC     Estimated Blood Loss (mL): Minimal    Complications: None    Specimens: * No specimens in log *     Implants: * No implants in log *    Drains: * No LDAs found *    Findings: As above    Electronically Signed by Love Cerna MD on 3/17/2023 at 10:52 AM

## 2023-03-17 NOTE — PERIOP NOTES
D/C reviewed with son Holden Kennedy via phone as he did not want to come into the hospital.  Pt dressed self with assistance, taking PO, denies pain, VSS, IV removed. Questions/concerns denied, states \"Dr Amy Camarena has done work on me before\" Pt was discharged with glasses, phone, ear buds, and ring was brought back from security.

## 2023-03-17 NOTE — DISCHARGE INSTRUCTIONS
SEE DR Karol Eng HARDCOPY DISCHARGE INSTRUCTIONS    DISCHARGE SUMMARY from your Nurse    The following personal items collected during your admission are returned to you:   Dental Appliance:    Vision:    Hearing Aid:    Jewelry: Jewelry: None  Clothing: Clothing: Shirt, Undergarments, Pants, Footwear  Other Valuables: Other Valuables: Cell Phone, Other (comment) (earpods)  Valuables sent to safe:      PATIENT INSTRUCTIONS:    After general anesthesia or intravenous sedation, for 24 hours or while taking prescription Narcotics:  Limit your activities  Do not drive and operate hazardous machinery  Do not make important personal or business decisions  Do  not drink alcoholic beverages  If you have not urinated within 8 hours after discharge, please contact your surgeon on call. Report the following to your surgeon:  Excessive pain, swelling, redness or odor of or around the surgical area  Temperature over 100.5  Nausea and vomiting lasting longer than 4 hours or if unable to take medications  Any signs of decreased circulation or nerve impairment to extremity: change in color, persistent  numbness, tingling, coldness or increase pain  Any questions    COUGH AND DEEP BREATHE    Breathing deep and coughing are very important exercises to do after surgery. Deep breathing and coughing open the little air tubes and air sacks in your lungs. You take deep breaths every day. You may not even notice - it is just something you do when you sigh or yawn. It is a natural exercise you do to keep these air passages open. After surgery, take deep breaths and cough, on purpose. Coughing and deep breathing help prevent bronchitis and pneumonia after surgery. If you had chest or belly surgery, use a pillow as a \"hug buddy\" and hold it tightly to your chest or belly when you cough. DIRECTIONS:  Take 10 to 15 slow deep breaths every hour while awake. Breathe in deeply, and hold it for 2 seconds.   Exhale slowly through puckered lips, like blowing up a balloon. After every 4th or 5th deep breath, hug your pillow to your chest or belly and give a hard, deep cough. Yes, it will probably hurt. But doing this exercise is very important part of healing after surgery. Take your pain medicine to help you do this exercise without too much pain. IF YOU HAVE BEEN DIAGNOSED WITH SLEEP APNEA, PLEASE USE YOUR SLEEP APNEA DEVICE OR CPAP MACHINE WHEN YOU INTEND TO NAP AFTER TAKING PAIN MEDICATION. Ankle Pumps    Ankle pumps increase the circulation of oxygenated blood to your lower extremities and decrease your risk for circulation problems such as blood clots. They also stretch the muscles, tendons and ligaments in your foot and ankle, and prevent joint contracture in the ankle and foot, especially after surgeries on the legs. It is important to do ankle pump exercises regularly after surgery because immobility increases your risk for developing a blood clot. Your doctor may also have you take an Aspirin for the next few days as well. If your doctor did not ask you to take an Aspirin, consult with him before starting Aspirin therapy on your own. Slowly point your foot forward, feeling the muscles on the top of your lower leg stretch, and hold this position for 5 seconds. Next, pull your foot back toward you as far as possible, stretching the calf muscles, and hold that position for 5 seconds. Repeat with the other foot. Perform 10 repetitions every hour while awake for both ankles if possible (down and then up with the foot once is one repetition). You should feel gentle stretching of the muscles in your lower leg when doing this exercise. If you feel pain, or your range of motion is limited, don't  Push too hard. Only go the limit your joint and muscles will let you go.   If you have increasing pain, progressively worsening leg warmth or swelling, STOP the exercise and call your doctor. Below is information about the medications your doctor is prescribing after your visit:    Other information in your discharge envelope:  []     PRESCRIPTIONS  []     SCHOOL/WORK NOTE  []     INFECTION PREVENTION  []     [place] [address]  []     REGIONAL NERVE BLOCK ON QUE PAMPHLET   []     EXPAREL  []     HANDICAP APPLICATION         These are general instructions for a healthy lifestyle:    *  Please give a list of your current medications to your Primary Care Provider. *  Please update this list whenever your medications are discontinued, doses are      changed, or new medications (including over-the-counter products) are added. *  Please carry medication information at all times in case of emergency situations. About Smoking  No smoking / No tobacco products / Avoid exposure to second hand smoke    Surgeon General's Warning:  Quitting smoking now greatly reduces serious risk to your health. Obesity, smoking, and sedentary lifestyle greatly increases your risk for illness and disease. A healthy diet, regular physical exercise & weight monitoring are important for maintaining a healthy lifestyle. Congestive Heart Failure  You may be retaining fluid if you have a history of heart failure or if you experience any of the following symptoms:  Weight gain of 3 pounds or more overnight or 5 pounds in a week, increased swelling in our hands or feet or shortness of breath while lying flat in bed. Please call your doctor as soon as you notice any of these symptoms; do not wait until your next office visit. Recognize signs and symptoms of STROKE:  F - face looks uneven  A - arms unable to move or move even  S - speech slurred or non-existent  T - time-call 911 as soon as signs and symptoms begin-DO NOT go         Back to bed or wait to see if you get better-TIME IS BRAIN. Warning signs of HEART ATTACK  Call 911 if you have these symptoms    Chest discomfort.   Most heart attacks involve discomfort in the center of the chest that lasts more than a few minutes, or that goes away and comes back. It can feel like uncomfortable pressure, squeezing, fullness, or pain. Discomfort in other areas of the upper body. Symptoms can include pain or discomfort in one or both        Arms, the back, neck, jaw, or stomach. Shortness of breath with or without chest discomfort. Other signs may include breaking out in a cold sweat, nausea, or lightheadedness    Don't wait more than five minutes to call 911 - MINUTES MATTER! Fast action can save your life. Calling 911 is almost always the fastest way to get lifesaving treatment. Emergency Medical Services staff can begin treatment when they arrive - up to an hour sooner than if someone gets to the hospital by car.

## 2023-03-22 NOTE — OP NOTES
Manjit Reina [facility] 79  OPERATIVE REPORT    Name:  Donna Benz  MR#:  [id number]  :  1955  ACCOUNT #:  [de-identified]  DATE OF SERVICE:  2023    PREOPERATIVE DIAGNOSIS:  Right middle finger trigger digit. POSTOPERATIVE DIAGNOSIS:  Right middle finger trigger digit. PROCEDURE PERFORMED:  Right middle finger A1 pulley release. SURGEON:  JEFF Burrows Christie:  Staff. ANESTHESIA:  Local and MAC. COMPLICATIONS:  None. SPECIMENS REMOVED:  None. IMPLANTS:  None. ESTIMATED BLOOD LOSS:  Minimal.    DRAINS:  None. DISPOSITION:  The patient was returned to PACU in stable condition. INDICATIONS:  This is a 79-year-old right-hand-dominant gentleman who had been experiencing catching and locking of his right middle finger. He had been treated previously with cortisone injection with temporary relief of his symptoms. After risks, benefits, and alternatives were discussed with the patient, he elected to proceed with aforementioned procedure. DESCRIPTION OF OPERATIVE PROCEDURE:  The patient was identified in the preoperative holding area. His right middle finger was signed. He was brought back to the operating room. A formal time-out was called to identify the correct surgical site. Prior to starting, the patient was given a digital block proximal to the right middle finger A1 pulley site using 1% lidocaine, 0.5% Marcaine plain, approximately 7-8 mL total.  Then the hand and arm were prepped and draped in normal sterile fashion. Tourniquet was inflated up to 250 mmHg for a full tourniquet time of less than 15 minutes. Incision was made over the right middle finger A1 pulley site coming through the skin and subcutaneous tissue. Skin hooks were placed. Longitudinal spreads were made. Neurovascular bundle on the radial and ulnar sides were identified and protected.   Third retractor was placed and using a [place] blade the A1 pulley was released in its entirety. A right-angle retractor was used to pull the FDS and FDP tendons independently out of the wound. The patient did have adhesions between the tendons, which was debrided. At this point, the wound was thoroughly irrigated out. Sedation was turned down. The patient was asked to actively flex and extend his digits and he was able to do so without evidence of locking or catching with the middle finger and also the ring finger. At this point, wound was thoroughly irrigated out, closed with 5-0 Prolene. Xeroform, 4x4s, Kerlix, and Coban were used to wrap the hand. Tourniquet was deflated. The patient was woken up in the operating room and returned to PACU in stable condition.       Gay Franco MD      [id number]  D:  03/21/2023 14:45  T:  03/21/2023 21:34  JOB #:  [id number]

## 2023-04-21 DIAGNOSIS — S46.111A RUPTURE OF TENDON OF BICEPS, LONG HEAD, RIGHT, INITIAL ENCOUNTER: Primary | ICD-10-CM

## 2023-08-11 ENCOUNTER — OFFICE VISIT (OUTPATIENT)
Age: 68
End: 2023-08-11

## 2023-08-11 VITALS
SYSTOLIC BLOOD PRESSURE: 118 MMHG | HEART RATE: 87 BPM | HEIGHT: 75 IN | BODY MASS INDEX: 29.97 KG/M2 | OXYGEN SATURATION: 96 % | TEMPERATURE: 98.2 F | WEIGHT: 241 LBS | DIASTOLIC BLOOD PRESSURE: 72 MMHG

## 2023-08-11 DIAGNOSIS — S93.505A: ICD-10-CM

## 2023-08-11 DIAGNOSIS — R60.0 EDEMA OF LEFT FOOT: ICD-10-CM

## 2023-08-11 DIAGNOSIS — M25.472 EDEMA OF LEFT ANKLE: Primary | ICD-10-CM

## 2023-08-11 NOTE — PATIENT INSTRUCTIONS
May try decreasing salt intake  Compression stocking    Decrease dose of amlodipine or d/c after talking with PCP    Fall precaution

## 2023-09-05 ENCOUNTER — HOSPITAL ENCOUNTER (OUTPATIENT)
Facility: HOSPITAL | Age: 68
Discharge: HOME | End: 2023-09-08
Attending: ORTHOPAEDIC SURGERY
Payer: MEDICARE

## 2023-09-05 DIAGNOSIS — M54.16 LUMBAR RADICULOPATHY: ICD-10-CM

## 2023-09-05 DIAGNOSIS — Z98.890 HISTORY OF LUMBAR LAMINECTOMY: ICD-10-CM

## 2023-09-05 DIAGNOSIS — M54.50 CHRONIC BILATERAL LOW BACK PAIN WITHOUT SCIATICA: ICD-10-CM

## 2023-09-05 DIAGNOSIS — M21.372 FOOT DROP, LEFT: ICD-10-CM

## 2023-09-05 DIAGNOSIS — R29.6 FREQUENT FALLS: ICD-10-CM

## 2023-09-05 DIAGNOSIS — G60.9 IDIOPATHIC POLYNEUROPATHY: ICD-10-CM

## 2023-09-05 DIAGNOSIS — G89.29 CHRONIC BILATERAL LOW BACK PAIN WITHOUT SCIATICA: ICD-10-CM

## 2023-09-05 DIAGNOSIS — M48.061 DEGENERATIVE LUMBAR SPINAL STENOSIS: ICD-10-CM

## 2023-09-05 DIAGNOSIS — M54.50 LUMBAR PAIN: ICD-10-CM

## 2023-09-05 DIAGNOSIS — M51.36 DDD (DEGENERATIVE DISC DISEASE), LUMBAR: ICD-10-CM

## 2023-09-05 DIAGNOSIS — M48.062 SPINAL STENOSIS OF LUMBAR REGION WITH NEUROGENIC CLAUDICATION: ICD-10-CM

## 2023-09-05 DIAGNOSIS — M47.816 LUMBAR SPONDYLOSIS: ICD-10-CM

## 2023-09-05 PROCEDURE — A9579 GAD-BASE MR CONTRAST NOS,1ML: HCPCS | Performed by: ORTHOPAEDIC SURGERY

## 2023-09-05 PROCEDURE — 6360000004 HC RX CONTRAST MEDICATION: Performed by: ORTHOPAEDIC SURGERY

## 2023-09-05 PROCEDURE — 72158 MRI LUMBAR SPINE W/O & W/DYE: CPT

## 2023-09-05 RX ADMIN — GADOTERIDOL 20 ML: 279.3 INJECTION, SOLUTION INTRAVENOUS at 17:20

## 2023-09-19 ENCOUNTER — HOSPITAL ENCOUNTER (OUTPATIENT)
Facility: HOSPITAL | Age: 68
Discharge: HOME | End: 2023-09-22
Attending: ORTHOPAEDIC SURGERY
Payer: MEDICARE

## 2023-09-19 DIAGNOSIS — M54.16 LUMBAR RADICULOPATHY: ICD-10-CM

## 2023-09-19 DIAGNOSIS — M54.16 RADICULOPATHY OF LUMBAR REGION: ICD-10-CM

## 2023-09-19 DIAGNOSIS — M47.816 LUMBAR SPONDYLOSIS: ICD-10-CM

## 2023-09-19 DIAGNOSIS — G89.29 CHRONIC BILATERAL LOW BACK PAIN WITHOUT SCIATICA: ICD-10-CM

## 2023-09-19 DIAGNOSIS — Z98.890 HISTORY OF LUMBAR LAMINECTOMY: ICD-10-CM

## 2023-09-19 DIAGNOSIS — R29.6 FREQUENT FALLS: ICD-10-CM

## 2023-09-19 DIAGNOSIS — G60.9 IDIOPATHIC POLYNEUROPATHY: ICD-10-CM

## 2023-09-19 DIAGNOSIS — M54.50 CHRONIC BILATERAL LOW BACK PAIN WITHOUT SCIATICA: ICD-10-CM

## 2023-09-19 DIAGNOSIS — M54.50 LUMBAR PAIN: ICD-10-CM

## 2023-09-19 DIAGNOSIS — M48.061 DEGENERATIVE LUMBAR SPINAL STENOSIS: ICD-10-CM

## 2023-09-19 DIAGNOSIS — M21.372 LEFT FOOT DROP: ICD-10-CM

## 2023-09-19 DIAGNOSIS — M51.36 DDD (DEGENERATIVE DISC DISEASE), LUMBAR: ICD-10-CM

## 2023-09-19 DIAGNOSIS — M48.062 SPINAL STENOSIS OF LUMBAR REGION WITH NEUROGENIC CLAUDICATION: ICD-10-CM

## 2023-09-19 PROCEDURE — 72131 CT LUMBAR SPINE W/O DYE: CPT

## 2023-10-27 ENCOUNTER — TELEPHONE (OUTPATIENT)
Age: 68
End: 2023-10-27

## 2023-10-27 NOTE — TELEPHONE ENCOUNTER
Pt called in reference to he is scheduled to have back operation on 11/28/2023 in [facility] and needs a cardiac clearance. Pt also stated that he would like his medical records sent to Dr. Jessi Lui at [address] [place] [address].      [facility] ph # [phone]  [facility] FAX # [phone]        Pt's ph # [phone]

## 2023-10-31 ENCOUNTER — TELEPHONE (OUTPATIENT)
Age: 68
End: 2023-10-31

## 2023-10-31 NOTE — TELEPHONE ENCOUNTER
Pt. Has a surgery scheduled, asking for a cardiac clearance, pt. Stated that he needs to be seen in the office for that clearance.      Surgery date - 11/28  [facility]  Dr. Slava Cooper  Phone - [phone]  No Fax

## 2023-11-02 NOTE — TELEPHONE ENCOUNTER
Verified patient with two types of identifiers. Mr. Kristal Guardado is feeling well from a cardiac standpoint. He denies any chest pain, shortness of breath or any other symptoms. Let him know Dr. Rosa Lagunas has no objection to the surgery and I will send his records to PCP.

## 2023-11-14 ENCOUNTER — HOSPITAL ENCOUNTER (OUTPATIENT)
Facility: HOSPITAL | Age: 68
Discharge: HOME | End: 2023-11-17
Payer: MEDICARE

## 2023-11-14 VITALS
DIASTOLIC BLOOD PRESSURE: 83 MMHG | OXYGEN SATURATION: 98 % | SYSTOLIC BLOOD PRESSURE: 131 MMHG | RESPIRATION RATE: 16 BRPM | WEIGHT: 243.39 LBS | HEIGHT: 74 IN | BODY MASS INDEX: 31.24 KG/M2 | HEART RATE: 61 BPM | TEMPERATURE: 98.8 F

## 2023-11-14 LAB
25(OH)D3 SERPL-MCNC: 33.1 NG/ML (ref 30–100)
ABO + RH BLD: NORMAL
ALBUMIN SERPL-MCNC: 4 G/DL (ref 3.5–5)
ALBUMIN/GLOB SERPL: 1.2 (ref 1.1–2.2)
ALP SERPL-CCNC: 75 U/L (ref 45–117)
ALT SERPL-CCNC: 50 U/L (ref 12–78)
AMPHET UR QL SCN: NEGATIVE
ANION GAP SERPL CALC-SCNC: 1 MMOL/L (ref 5–15)
APPEARANCE UR: CLEAR
AST SERPL-CCNC: 31 U/L (ref 15–37)
BACTERIA URNS QL MICRO: NEGATIVE /HPF
BARBITURATES UR QL SCN: NEGATIVE
BENZODIAZ UR QL: NEGATIVE
BILIRUB SERPL-MCNC: 0.5 MG/DL (ref 0.2–1)
BILIRUB UR QL: NEGATIVE
BLOOD GROUP ANTIBODIES SERPL: NORMAL
BUN SERPL-MCNC: 12 MG/DL (ref 6–20)
BUN/CREAT SERPL: 15 (ref 12–20)
CALCIUM SERPL-MCNC: 9.2 MG/DL (ref 8.5–10.1)
CANNABINOIDS UR QL SCN: NEGATIVE
CHLORIDE SERPL-SCNC: 105 MMOL/L (ref 97–108)
CO2 SERPL-SCNC: 32 MMOL/L (ref 21–32)
COCAINE UR QL SCN: NEGATIVE
COLOR UR: NORMAL
CREAT SERPL-MCNC: 0.82 MG/DL (ref 0.7–1.3)
EPITH CASTS URNS QL MICRO: NORMAL /LPF
ERYTHROCYTE [DISTWIDTH] IN BLOOD BY AUTOMATED COUNT: 13.4 % (ref 11.5–14.5)
EST. AVERAGE GLUCOSE BLD GHB EST-MCNC: 100 MG/DL
GLOBULIN SER CALC-MCNC: 3.4 G/DL (ref 2–4)
GLUCOSE SERPL-MCNC: 91 MG/DL (ref 65–100)
GLUCOSE UR STRIP.AUTO-MCNC: NEGATIVE MG/DL
HBA1C MFR BLD: 5.1 % (ref 4–5.6)
HCT VFR BLD AUTO: 41.3 % (ref 36.6–50.3)
HGB BLD-MCNC: 14 G/DL (ref 12.1–17)
HGB UR QL STRIP: NEGATIVE
HYALINE CASTS URNS QL MICRO: NORMAL /LPF (ref 0–2)
INR PPP: 1.1 (ref 0.9–1.1)
KETONES UR QL STRIP.AUTO: NEGATIVE MG/DL
LEUKOCYTE ESTERASE UR QL STRIP.AUTO: NEGATIVE
Lab: NORMAL
MCH RBC QN AUTO: 32.2 PG (ref 26–34)
MCHC RBC AUTO-ENTMCNC: 33.9 G/DL (ref 30–36.5)
MCV RBC AUTO: 94.9 FL (ref 80–99)
METHADONE UR QL: NEGATIVE
NITRITE UR QL STRIP.AUTO: NEGATIVE
NRBC # BLD: 0 K/UL (ref 0–0.01)
NRBC BLD-RTO: 0 PER 100 WBC
OPIATES UR QL: NEGATIVE
PCP UR QL: NEGATIVE
PH UR STRIP: 7 (ref 5–8)
PLATELET # BLD AUTO: 173 K/UL (ref 150–400)
PMV BLD AUTO: 9.5 FL (ref 8.9–12.9)
POTASSIUM SERPL-SCNC: 3.7 MMOL/L (ref 3.5–5.1)
PREALB SERPL-MCNC: 29.7 MG/DL (ref 20–40)
PROT SERPL-MCNC: 7.4 G/DL (ref 6.4–8.2)
PROT UR STRIP-MCNC: NEGATIVE MG/DL
PROTHROMBIN TIME: 11.4 SEC (ref 9–11.1)
RBC # BLD AUTO: 4.35 M/UL (ref 4.1–5.7)
RBC #/AREA URNS HPF: NORMAL /HPF (ref 0–5)
SODIUM SERPL-SCNC: 138 MMOL/L (ref 136–145)
SP GR UR REFRACTOMETRY: 1.01
SPECIMEN EXP DATE BLD: NORMAL
URINE CULTURE IF INDICATED: NORMAL
UROBILINOGEN UR QL STRIP.AUTO: 0.2 EU/DL (ref 0.2–1)
WBC # BLD AUTO: 5.1 K/UL (ref 4.1–11.1)
WBC URNS QL MICRO: NORMAL /HPF (ref 0–4)

## 2023-11-14 PROCEDURE — 36415 COLL VENOUS BLD VENIPUNCTURE: CPT

## 2023-11-14 PROCEDURE — 97161 PT EVAL LOW COMPLEX 20 MIN: CPT

## 2023-11-14 PROCEDURE — 84134 ASSAY OF PREALBUMIN: CPT

## 2023-11-14 PROCEDURE — 93005 ELECTROCARDIOGRAM TRACING: CPT

## 2023-11-14 PROCEDURE — 85027 COMPLETE CBC AUTOMATED: CPT

## 2023-11-14 PROCEDURE — 80053 COMPREHEN METABOLIC PANEL: CPT

## 2023-11-14 PROCEDURE — 85610 PROTHROMBIN TIME: CPT

## 2023-11-14 PROCEDURE — 86900 BLOOD TYPING SEROLOGIC ABO: CPT

## 2023-11-14 PROCEDURE — 86850 RBC ANTIBODY SCREEN: CPT

## 2023-11-14 PROCEDURE — 80307 DRUG TEST PRSMV CHEM ANLYZR: CPT

## 2023-11-14 PROCEDURE — 82306 VITAMIN D 25 HYDROXY: CPT

## 2023-11-14 PROCEDURE — 81001 URINALYSIS AUTO W/SCOPE: CPT

## 2023-11-14 PROCEDURE — 83036 HEMOGLOBIN GLYCOSYLATED A1C: CPT

## 2023-11-14 PROCEDURE — 71046 X-RAY EXAM CHEST 2 VIEWS: CPT

## 2023-11-14 PROCEDURE — 86901 BLOOD TYPING SEROLOGIC RH(D): CPT

## 2023-11-14 PROCEDURE — 97530 THERAPEUTIC ACTIVITIES: CPT

## 2023-11-14 RX ORDER — ACETAMINOPHEN 500 MG
1000 TABLET ORAL ONCE
OUTPATIENT
Start: 2023-11-28

## 2023-11-14 RX ORDER — LAMOTRIGINE 25 MG/1
225 TABLET ORAL NIGHTLY
COMMUNITY

## 2023-11-14 RX ORDER — DULOXETIN HYDROCHLORIDE 60 MG/1
90 CAPSULE, DELAYED RELEASE ORAL NIGHTLY
COMMUNITY

## 2023-11-14 RX ORDER — SODIUM CHLORIDE, SODIUM LACTATE, POTASSIUM CHLORIDE, CALCIUM CHLORIDE 600; 310; 30; 20 MG/100ML; MG/100ML; MG/100ML; MG/100ML
INJECTION, SOLUTION INTRAVENOUS CONTINUOUS
OUTPATIENT
Start: 2023-11-28

## 2023-11-14 RX ORDER — DOCUSATE SODIUM 100 MG/1
100 CAPSULE, LIQUID FILLED ORAL DAILY
COMMUNITY

## 2023-11-14 RX ORDER — PREGABALIN 150 MG/1
150 CAPSULE ORAL ONCE
OUTPATIENT
Start: 2023-11-28

## 2023-11-14 RX ORDER — MELOXICAM 7.5 MG/1
7.5 TABLET ORAL
COMMUNITY

## 2023-11-14 RX ORDER — SENNOSIDES 8.6 MG
1300 CAPSULE ORAL
COMMUNITY

## 2023-11-14 RX ORDER — TADALAFIL 20 MG/1
20 TABLET ORAL PRN
COMMUNITY

## 2023-11-14 NOTE — PERIOP NOTE

## 2023-11-14 NOTE — PERIOP NOTE
[facility]  \"We've Got Your Back! Caring For Yourself Before and After Spine Surgery\" handbook was provided & reviewed with the patient during the pre-admission testing (PAT) appointment. An opportunity for questions was provided, patient verbalized understanding.

## 2023-11-14 NOTE — PERIOP NOTE

## 2023-11-15 LAB
BACTERIA SPEC CULT: NORMAL
BACTERIA SPEC CULT: NORMAL
EKG ATRIAL RATE: 60 BPM
EKG DIAGNOSIS: NORMAL
EKG P AXIS: 78 DEGREES
EKG P-R INTERVAL: 194 MS
EKG Q-T INTERVAL: 422 MS
EKG QRS DURATION: 126 MS
EKG QTC CALCULATION (BAZETT): 422 MS
EKG R AXIS: -86 DEGREES
EKG T AXIS: 85 DEGREES
EKG VENTRICULAR RATE: 60 BPM
SERVICE CMNT-IMP: NORMAL

## 2023-11-16 NOTE — PERIOP NOTE
Reviewed pre admission testing (PAT) lab results with patient and provided instructions to start Mupirocin prescription that was sent to patients pharmacy, BID x 5 days in both nostrils; patient verbalized understanding.

## 2024-02-23 ENCOUNTER — HOSPITAL ENCOUNTER (OUTPATIENT)
Facility: HOSPITAL | Age: 69
End: 2024-02-23
Payer: MEDICARE

## 2024-02-23 DIAGNOSIS — M48.02 CERVICAL STENOSIS OF SPINE: ICD-10-CM

## 2024-02-23 DIAGNOSIS — M48.04 THORACIC STENOSIS: ICD-10-CM

## 2024-02-23 PROCEDURE — 72146 MRI CHEST SPINE W/O DYE: CPT

## 2024-02-23 PROCEDURE — 72141 MRI NECK SPINE W/O DYE: CPT

## 2024-02-29 ENCOUNTER — OFFICE VISIT (OUTPATIENT)
Age: 69
End: 2024-02-29
Payer: MEDICARE

## 2024-02-29 VITALS
WEIGHT: 248 LBS | HEIGHT: 74 IN | BODY MASS INDEX: 31.83 KG/M2 | SYSTOLIC BLOOD PRESSURE: 108 MMHG | OXYGEN SATURATION: 98 % | DIASTOLIC BLOOD PRESSURE: 60 MMHG | HEART RATE: 66 BPM

## 2024-02-29 DIAGNOSIS — I10 PRIMARY HYPERTENSION: ICD-10-CM

## 2024-02-29 DIAGNOSIS — R06.09 DOE (DYSPNEA ON EXERTION): ICD-10-CM

## 2024-02-29 DIAGNOSIS — I25.10 CORONARY ARTERY DISEASE INVOLVING NATIVE CORONARY ARTERY OF NATIVE HEART WITHOUT ANGINA PECTORIS: Primary | ICD-10-CM

## 2024-02-29 DIAGNOSIS — R60.0 LOCALIZED EDEMA: ICD-10-CM

## 2024-02-29 DIAGNOSIS — Z01.810 PRE-OPERATIVE CARDIOVASCULAR EXAMINATION: ICD-10-CM

## 2024-02-29 DIAGNOSIS — E78.2 MIXED HYPERLIPIDEMIA: ICD-10-CM

## 2024-02-29 PROCEDURE — 3074F SYST BP LT 130 MM HG: CPT | Performed by: SPECIALIST

## 2024-02-29 PROCEDURE — 1036F TOBACCO NON-USER: CPT | Performed by: SPECIALIST

## 2024-02-29 PROCEDURE — G8484 FLU IMMUNIZE NO ADMIN: HCPCS | Performed by: SPECIALIST

## 2024-02-29 PROCEDURE — 99214 OFFICE O/P EST MOD 30 MIN: CPT | Performed by: SPECIALIST

## 2024-02-29 PROCEDURE — 3078F DIAST BP <80 MM HG: CPT | Performed by: SPECIALIST

## 2024-02-29 PROCEDURE — G8427 DOCREV CUR MEDS BY ELIG CLIN: HCPCS | Performed by: SPECIALIST

## 2024-02-29 PROCEDURE — 1123F ACP DISCUSS/DSCN MKR DOCD: CPT | Performed by: SPECIALIST

## 2024-02-29 PROCEDURE — G8417 CALC BMI ABV UP PARAM F/U: HCPCS | Performed by: SPECIALIST

## 2024-02-29 PROCEDURE — 3017F COLORECTAL CA SCREEN DOC REV: CPT | Performed by: SPECIALIST

## 2024-02-29 ASSESSMENT — PATIENT HEALTH QUESTIONNAIRE - PHQ9
SUM OF ALL RESPONSES TO PHQ QUESTIONS 1-9: 0
SUM OF ALL RESPONSES TO PHQ QUESTIONS 1-9: 0
1. LITTLE INTEREST OR PLEASURE IN DOING THINGS: 0
SUM OF ALL RESPONSES TO PHQ QUESTIONS 1-9: 0
2. FEELING DOWN, DEPRESSED OR HOPELESS: 0
SUM OF ALL RESPONSES TO PHQ9 QUESTIONS 1 & 2: 0
SUM OF ALL RESPONSES TO PHQ QUESTIONS 1-9: 0

## 2024-02-29 NOTE — PROGRESS NOTES
annual follow up.    1. Coronary artery disease involving native coronary artery of native heart without angina pectoris  -prior MI, got tpa?  Cath around 2000, [place] and edema better off Etoh  Fixed lateral defect consistent on nuclear with moderate scar with preserved EF  Continue ASA, statin and BB  No angina, stable    2. [place] (dyspnea on exertion)  Improved EF normal on echo and nuclear  Has no ischemia    3. Pedal edema  Transient, sock line today right >left minor, normal EF  Previous skin change of vasculitis, if recurs, see Derm for diagnostic biopsy    4. Mixed hyperlipidemia  Lipids on high potency statin as appropriate for secondary prevention.   At goal , denies excess muscle aches or new liver issues  On rosuvastatin also per pt,labs with PCP   Epic shows LDL 47 on 9/30/22 at [facility], TG 69  HDL 55  LDL  at goal , denies excess muscle aches or new liver issues  ezetimibe - 10 MG  rosuvastatin - 40 MG     5. Primary hypertension  At goal , meds and possible side effects reviewed and patient denies  Key Anti-Hypertensive Meds            tadalafil (CIALIS) 20 MG tablet (Taking)    Class: Historical Med    amLODIPine (NORVASC) 10 MG tablet (Taking)    Class: Historical Med    losartan (COZAAR) 50 MG tablet (Taking)    Class: Historical Med    tadalafil (CIALIS) 5 MG tablet (Taking)    Class: Historical Med           BP Readings from Last 6 Encounters:   02/29/24 108/60   11/14/23 131/83   08/11/23 118/72   06/16/23 (!) 154/90   03/17/23 128/82   02/22/23 110/70          6. Chronic back pain, unspecified back location, unspecified back pain laterality  Complex issue  Now preop  I have no objection to the planned  surgery. Patient seems to be at a low risk for patricia-operative severe adverse cardiac events.   7. Alcohol abuse with mood disorder  BP down 40 points, going to AA     Impression:   1. Coronary artery disease involving native coronary artery of native heart without angina pectoris    2. [place]

## 2025-02-11 ENCOUNTER — TELEPHONE (OUTPATIENT)
Age: 70
End: 2025-02-11

## 2025-02-11 NOTE — TELEPHONE ENCOUNTER
Tried to contact pt phone line busy. If pt calls back please inform him unfortunately Dr. Leggett is not accepting new pts. Please offer an appt with another provider.-TM 2/11/25

## 2025-02-11 NOTE — TELEPHONE ENCOUNTER
----- Message from CHRIS ROD MA sent at 2/10/2025  2:50 PM EST -----  Regarding: FW: [facility] Appointment Request    ----- Message -----  From: [place]  Sent: 2/7/2025   1:37 PM EST  To: Vishal Escobedo Clinical Staff  Subject: [facility] Appointment Request                          [facility] Appointment Request    Patient needs appointment for [facility] Appointment Type: New Patient.    Patient Requested Dates(s): soonest available  Patient Requested Time: Any time  Provider Name: Toyin Leggett MD    Reason for Appointment Request: New Patient - Requested Provider unavailable  --------------------------------------------------------------------------------------------------------------------------    Relationship to Patient: Self     Call Back Information: OK to leave message on voicemail  Preferred Call Back Number: Phone [phone] (home)

## 2025-03-03 ENCOUNTER — TRANSCRIBE ORDERS (OUTPATIENT)
Facility: HOSPITAL | Age: 70
End: 2025-03-03

## 2025-03-03 DIAGNOSIS — R22.42 LOCALIZED SWELLING OF LEFT FOOT: ICD-10-CM

## 2025-03-03 DIAGNOSIS — M24.272 LIGAMENTOUS LAXITY OF LEFT ANKLE: Primary | ICD-10-CM

## 2025-03-10 ENCOUNTER — HOSPITAL ENCOUNTER (OUTPATIENT)
Facility: HOSPITAL | Age: 70
Discharge: HOME | End: 2025-03-13
Payer: MEDICARE

## 2025-03-10 DIAGNOSIS — R22.42 LOCALIZED SWELLING OF LEFT FOOT: ICD-10-CM

## 2025-03-10 DIAGNOSIS — M24.272 LIGAMENTOUS LAXITY OF LEFT ANKLE: ICD-10-CM

## 2025-03-10 PROCEDURE — 73723 MRI JOINT LWR EXTR W/O&W/DYE: CPT

## 2025-03-10 PROCEDURE — A9579 GAD-BASE MR CONTRAST NOS,1ML: HCPCS | Performed by: RADIOLOGY

## 2025-03-10 PROCEDURE — 73720 MRI LWR EXTREMITY W/O&W/DYE: CPT

## 2025-03-10 PROCEDURE — 6360000004 HC RX CONTRAST MEDICATION: Performed by: RADIOLOGY

## 2025-03-10 RX ADMIN — GADOTERIDOL 20 ML: 279.3 INJECTION, SOLUTION INTRAVENOUS at 19:47

## 2025-03-18 ENCOUNTER — OFFICE VISIT (OUTPATIENT)
Age: 70
End: 2025-03-18
Payer: MEDICARE

## 2025-03-18 VITALS
HEIGHT: 74 IN | WEIGHT: 235 LBS | BODY MASS INDEX: 30.16 KG/M2 | OXYGEN SATURATION: 99 % | HEART RATE: 77 BPM | SYSTOLIC BLOOD PRESSURE: 138 MMHG | DIASTOLIC BLOOD PRESSURE: 88 MMHG

## 2025-03-18 DIAGNOSIS — I10 PRIMARY HYPERTENSION: ICD-10-CM

## 2025-03-18 DIAGNOSIS — I25.10 CORONARY ARTERY DISEASE INVOLVING NATIVE CORONARY ARTERY OF NATIVE HEART WITHOUT ANGINA PECTORIS: Primary | ICD-10-CM

## 2025-03-18 DIAGNOSIS — E78.2 MIXED HYPERLIPIDEMIA: ICD-10-CM

## 2025-03-18 PROCEDURE — 1036F TOBACCO NON-USER: CPT | Performed by: SPECIALIST

## 2025-03-18 PROCEDURE — 3017F COLORECTAL CA SCREEN DOC REV: CPT | Performed by: SPECIALIST

## 2025-03-18 PROCEDURE — G8427 DOCREV CUR MEDS BY ELIG CLIN: HCPCS | Performed by: SPECIALIST

## 2025-03-18 PROCEDURE — 3079F DIAST BP 80-89 MM HG: CPT | Performed by: SPECIALIST

## 2025-03-18 PROCEDURE — 99214 OFFICE O/P EST MOD 30 MIN: CPT | Performed by: SPECIALIST

## 2025-03-18 PROCEDURE — 1123F ACP DISCUSS/DSCN MKR DOCD: CPT | Performed by: SPECIALIST

## 2025-03-18 PROCEDURE — 1125F AMNT PAIN NOTED PAIN PRSNT: CPT | Performed by: SPECIALIST

## 2025-03-18 PROCEDURE — G8417 CALC BMI ABV UP PARAM F/U: HCPCS | Performed by: SPECIALIST

## 2025-03-18 PROCEDURE — 1159F MED LIST DOCD IN RCRD: CPT | Performed by: SPECIALIST

## 2025-03-18 PROCEDURE — 3075F SYST BP GE 130 - 139MM HG: CPT | Performed by: SPECIALIST

## 2025-03-18 PROCEDURE — 93000 ELECTROCARDIOGRAM COMPLETE: CPT | Performed by: SPECIALIST

## 2025-03-18 ASSESSMENT — PATIENT HEALTH QUESTIONNAIRE - PHQ9
SUM OF ALL RESPONSES TO PHQ QUESTIONS 1-9: 0
2. FEELING DOWN, DEPRESSED OR HOPELESS: NOT AT ALL
1. LITTLE INTEREST OR PLEASURE IN DOING THINGS: NOT AT ALL

## 2025-08-19 ENCOUNTER — HOSPITAL ENCOUNTER (OUTPATIENT)
Facility: HOSPITAL | Age: 70
Discharge: HOME | End: 2025-08-22
Payer: MEDICARE

## 2025-08-19 ENCOUNTER — TRANSCRIBE ORDERS (OUTPATIENT)
Facility: HOSPITAL | Age: 70
End: 2025-08-19

## 2025-08-19 DIAGNOSIS — R07.2 PRECORDIAL PAIN: Primary | ICD-10-CM

## 2025-08-19 DIAGNOSIS — R07.2 PRECORDIAL PAIN: ICD-10-CM

## 2025-08-19 LAB — CREAT BLD-MCNC: 0.8 MG/DL (ref 0.6–1.3)

## 2025-08-19 PROCEDURE — 71260 CT THORAX DX C+: CPT

## 2025-08-19 PROCEDURE — 6360000004 HC RX CONTRAST MEDICATION: Performed by: INTERNAL MEDICINE

## 2025-08-19 PROCEDURE — 82565 ASSAY OF CREATININE: CPT

## 2025-08-19 RX ORDER — IOPAMIDOL 755 MG/ML
100 INJECTION, SOLUTION INTRAVASCULAR
Status: COMPLETED | OUTPATIENT
Start: 2025-08-19 | End: 2025-08-19

## 2025-08-19 RX ADMIN — IOPAMIDOL 100 ML: 755 INJECTION, SOLUTION INTRAVENOUS at 11:52

## (undated) DEVICE — DRESSING,GAUZE,XEROFORM,CURAD,1"X8",ST: Brand: CURAD

## (undated) DEVICE — SUTURE PROL SZ 5-0 L18IN NONABSORBABLE BLU L13MM P-3 3/8 8698G

## (undated) DEVICE — ZIMMER® STERILE DISPOSABLE TOURNIQUET CUFF WITH PROTECTIVE SLEEVE AND PLC, DUAL PORT, SINGLE BLADDER, 18 IN. (46 CM)

## (undated) DEVICE — SOLUTION IRRIG 500ML 0.9% SOD CHLO USP POUR PLAS BTL

## (undated) DEVICE — BLADE OPHTH MINI BEAV SHRP --

## (undated) DEVICE — HAND-SFMCASU: Brand: MEDLINE INDUSTRIES, INC.

## (undated) DEVICE — CARDINAL HEALTH VESSEL LOOPS MINI RED: Brand: DEVON

## (undated) DEVICE — WRAP COHESIVE W2INXL5YD TAN SELF ADH BNDG HND NON STERILE TEAR CARING

## (undated) DEVICE — GLOVE ORTHO 7 1/2   MSG9475